# Patient Record
Sex: MALE | Race: WHITE
[De-identification: names, ages, dates, MRNs, and addresses within clinical notes are randomized per-mention and may not be internally consistent; named-entity substitution may affect disease eponyms.]

---

## 2018-03-30 ENCOUNTER — HOSPITAL ENCOUNTER (INPATIENT)
Dept: HOSPITAL 83 - ED | Age: 68
LOS: 1 days | Discharge: HOME | DRG: 69 | End: 2018-03-31
Attending: INTERNAL MEDICINE | Admitting: INTERNAL MEDICINE
Payer: MEDICARE

## 2018-03-30 VITALS — DIASTOLIC BLOOD PRESSURE: 85 MMHG | SYSTOLIC BLOOD PRESSURE: 160 MMHG

## 2018-03-30 VITALS — WEIGHT: 243.5 LBS | HEIGHT: 71.97 IN | BODY MASS INDEX: 32.98 KG/M2

## 2018-03-30 VITALS — SYSTOLIC BLOOD PRESSURE: 159 MMHG | DIASTOLIC BLOOD PRESSURE: 82 MMHG

## 2018-03-30 VITALS — DIASTOLIC BLOOD PRESSURE: 69 MMHG | SYSTOLIC BLOOD PRESSURE: 156 MMHG

## 2018-03-30 VITALS — DIASTOLIC BLOOD PRESSURE: 71 MMHG

## 2018-03-30 VITALS — DIASTOLIC BLOOD PRESSURE: 79 MMHG

## 2018-03-30 DIAGNOSIS — Z87.891: ICD-10-CM

## 2018-03-30 DIAGNOSIS — Z98.42: ICD-10-CM

## 2018-03-30 DIAGNOSIS — Z91.048: ICD-10-CM

## 2018-03-30 DIAGNOSIS — F32.9: ICD-10-CM

## 2018-03-30 DIAGNOSIS — I10: ICD-10-CM

## 2018-03-30 DIAGNOSIS — D64.9: ICD-10-CM

## 2018-03-30 DIAGNOSIS — Z79.899: ICD-10-CM

## 2018-03-30 DIAGNOSIS — Z98.41: ICD-10-CM

## 2018-03-30 DIAGNOSIS — Z96.652: ICD-10-CM

## 2018-03-30 DIAGNOSIS — D72.821: ICD-10-CM

## 2018-03-30 DIAGNOSIS — G45.9: Primary | ICD-10-CM

## 2018-03-30 LAB
ALBUMIN SERPL-MCNC: 3 GM/DL (ref 3.1–4.5)
ALP SERPL-CCNC: 54 U/L (ref 45–117)
ALT SERPL W P-5'-P-CCNC: 37 U/L (ref 12–78)
AST SERPL-CCNC: 15 IU/L (ref 3–35)
BASOPHILS # BLD AUTO: 0.1 10*3/UL (ref 0–0.1)
BASOPHILS NFR BLD AUTO: 0.8 % (ref 0–1)
BUN SERPL-MCNC: 10 MG/DL (ref 7–24)
CHLORIDE SERPL-SCNC: 102 MMOL/L (ref 98–107)
CREAT SERPL-MCNC: 0.89 MG/DL (ref 0.7–1.3)
EOSINOPHIL # BLD AUTO: 0.2 10*3/UL (ref 0–0.4)
EOSINOPHIL # BLD AUTO: 2.6 % (ref 1–4)
ERYTHROCYTE [DISTWIDTH] IN BLOOD BY AUTOMATED COUNT: 12.4 % (ref 0–14.5)
HCT VFR BLD AUTO: 40.7 % (ref 42–52)
HGB BLD-MCNC: 13.8 G/DL (ref 14–18)
INR BLD: 0.9 (ref 2–3.5)
LYMPHOCYTES # BLD AUTO: 0.9 10*3/UL (ref 1.3–4.4)
LYMPHOCYTES NFR BLD AUTO: 14.5 % (ref 27–41)
MCH RBC QN AUTO: 29 PG (ref 27–31)
MCHC RBC AUTO-ENTMCNC: 33.9 G/DL (ref 33–37)
MCV RBC AUTO: 85.5 FL (ref 80–94)
MONOCYTES # BLD AUTO: 0.7 10*3/UL (ref 0.1–1)
MONOCYTES NFR BLD MANUAL: 11.6 % (ref 3–9)
NEUT #: 4.3 10*3/UL (ref 2.3–7.9)
NEUT %: 70.2 % (ref 47–73)
NRBC BLD QL AUTO: 0 % (ref 0–0)
PLATELET # BLD AUTO: 259 10*3/UL (ref 130–400)
PMV BLD AUTO: 9.2 FL (ref 9.6–12.3)
POTASSIUM SERPL-SCNC: 3.9 MMOL/L (ref 3.5–5.1)
PROT SERPL-MCNC: 6.2 GM/DL (ref 6.4–8.2)
RBC # BLD AUTO: 4.76 10*6/UL (ref 4.5–5.9)
SODIUM SERPL-SCNC: 140 MMOL/L (ref 136–145)
TROPONIN I SERPL-MCNC: < 0.015 NG/ML (ref ?–0.04)
WBC NRBC COR # BLD AUTO: 6.1 10*3/UL (ref 4.8–10.8)

## 2018-03-31 VITALS — DIASTOLIC BLOOD PRESSURE: 90 MMHG

## 2018-03-31 VITALS — DIASTOLIC BLOOD PRESSURE: 80 MMHG

## 2018-03-31 VITALS — SYSTOLIC BLOOD PRESSURE: 176 MMHG | DIASTOLIC BLOOD PRESSURE: 82 MMHG

## 2018-03-31 VITALS — SYSTOLIC BLOOD PRESSURE: 150 MMHG | DIASTOLIC BLOOD PRESSURE: 84 MMHG

## 2018-03-31 LAB
25(OH)D3 SERPL-MCNC: 36.2 NG/ML (ref 30–100)
ALBUMIN SERPL-MCNC: 2.8 GM/DL (ref 3.1–4.5)
ALP SERPL-CCNC: 48 U/L (ref 45–117)
ALT SERPL W P-5'-P-CCNC: 34 U/L (ref 12–78)
AST SERPL-CCNC: 15 IU/L (ref 3–35)
BASOPHILS # BLD AUTO: 0.1 10*3/UL (ref 0–0.1)
BASOPHILS NFR BLD AUTO: 0.8 % (ref 0–1)
BUN SERPL-MCNC: 8 MG/DL (ref 7–24)
CHLORIDE SERPL-SCNC: 102 MMOL/L (ref 98–107)
CHOLEST SERPL-MCNC: 167 MG/DL (ref ?–200)
CREAT SERPL-MCNC: 0.68 MG/DL (ref 0.7–1.3)
EOSINOPHIL # BLD AUTO: 0.3 10*3/UL (ref 0–0.4)
EOSINOPHIL # BLD AUTO: 4.8 % (ref 1–4)
ERYTHROCYTE [DISTWIDTH] IN BLOOD BY AUTOMATED COUNT: 12.3 % (ref 0–14.5)
HCT VFR BLD AUTO: 38.8 % (ref 42–52)
HDLC SERPL-MCNC: 62 MG/DL (ref 40–60)
HGB BLD-MCNC: 13.2 G/DL (ref 14–18)
INR BLD: 0.9 (ref 2–3.5)
LDLC SERPL DIRECT ASSAY-MCNC: 92 MG/DL (ref 9–159)
LYMPHOCYTES # BLD AUTO: 2 10*3/UL (ref 1.3–4.4)
LYMPHOCYTES NFR BLD AUTO: 33.4 % (ref 27–41)
MCH RBC QN AUTO: 28.8 PG (ref 27–31)
MCHC RBC AUTO-ENTMCNC: 34 G/DL (ref 33–37)
MCV RBC AUTO: 84.7 FL (ref 80–94)
MONOCYTES # BLD AUTO: 0.8 10*3/UL (ref 0.1–1)
MONOCYTES NFR BLD MANUAL: 12.9 % (ref 3–9)
NEUT #: 2.8 10*3/UL (ref 2.3–7.9)
NEUT %: 47.9 % (ref 47–73)
NRBC BLD QL AUTO: 0 % (ref 0–0)
PHOSPHATE SERPL-MCNC: 3 MG/DL (ref 2.5–4.9)
PLATELET # BLD AUTO: 265 10*3/UL (ref 130–400)
PMV BLD AUTO: 9.9 FL (ref 9.6–12.3)
POTASSIUM SERPL-SCNC: 3.1 MMOL/L (ref 3.5–5.1)
PROT SERPL-MCNC: 6 GM/DL (ref 6.4–8.2)
RBC # BLD AUTO: 4.58 10*6/UL (ref 4.5–5.9)
SODIUM SERPL-SCNC: 139 MMOL/L (ref 136–145)
T4 FREE SERPL-MCNC: 1.14 NG/DL (ref 0.76–1.46)
TRIGL SERPL-MCNC: 66 MG/DL (ref ?–150)
TSH SERPL DL<=0.005 MIU/L-ACNC: 0.02 UIU/ML (ref 0.36–4.75)
VITAMIN B12: 403 PG/ML (ref 247–911)
VLDLC SERPL CALC-MCNC: 13 MG/DL (ref 6–40)
WBC NRBC COR # BLD AUTO: 5.9 10*3/UL (ref 4.8–10.8)

## 2018-04-02 ENCOUNTER — HOSPITAL ENCOUNTER (OUTPATIENT)
Dept: HOSPITAL 83 - MRI | Age: 68
Discharge: HOME | End: 2018-04-02
Attending: HOSPITALIST
Payer: MEDICARE

## 2018-04-02 DIAGNOSIS — I51.7: Primary | ICD-10-CM

## 2018-06-20 DIAGNOSIS — I73.9 PVD (PERIPHERAL VASCULAR DISEASE) (HCC): Primary | ICD-10-CM

## 2020-01-23 ENCOUNTER — HOSPITAL ENCOUNTER (INPATIENT)
Dept: HOSPITAL 83 - ED | Age: 70
LOS: 1 days | Discharge: HOME | DRG: 641 | End: 2020-01-24
Attending: INTERNAL MEDICINE | Admitting: INTERNAL MEDICINE
Payer: MEDICARE

## 2020-01-23 VITALS — HEIGHT: 72 IN | WEIGHT: 243.5 LBS | BODY MASS INDEX: 32.98 KG/M2

## 2020-01-23 VITALS — DIASTOLIC BLOOD PRESSURE: 68 MMHG

## 2020-01-23 VITALS — SYSTOLIC BLOOD PRESSURE: 175 MMHG | DIASTOLIC BLOOD PRESSURE: 74 MMHG

## 2020-01-23 DIAGNOSIS — E87.1: ICD-10-CM

## 2020-01-23 DIAGNOSIS — Z96.652: ICD-10-CM

## 2020-01-23 DIAGNOSIS — E66.9: ICD-10-CM

## 2020-01-23 DIAGNOSIS — Z87.891: ICD-10-CM

## 2020-01-23 DIAGNOSIS — E87.6: Primary | ICD-10-CM

## 2020-01-23 DIAGNOSIS — Z98.41: ICD-10-CM

## 2020-01-23 DIAGNOSIS — E78.5: ICD-10-CM

## 2020-01-23 DIAGNOSIS — E03.9: ICD-10-CM

## 2020-01-23 DIAGNOSIS — R55: ICD-10-CM

## 2020-01-23 DIAGNOSIS — I10: ICD-10-CM

## 2020-01-23 DIAGNOSIS — Z86.73: ICD-10-CM

## 2020-01-23 DIAGNOSIS — Z82.49: ICD-10-CM

## 2020-01-23 DIAGNOSIS — Z80.8: ICD-10-CM

## 2020-01-23 DIAGNOSIS — Z91.09: ICD-10-CM

## 2020-01-23 DIAGNOSIS — Z98.42: ICD-10-CM

## 2020-01-23 DIAGNOSIS — Z79.899: ICD-10-CM

## 2020-01-23 DIAGNOSIS — R73.9: ICD-10-CM

## 2020-01-23 DIAGNOSIS — F32.9: ICD-10-CM

## 2020-01-23 LAB
ALBUMIN SERPL-MCNC: 3.5 GM/DL (ref 3.1–4.5)
ALP SERPL-CCNC: 48 U/L (ref 45–117)
ALT SERPL W P-5'-P-CCNC: 46 U/L (ref 12–78)
APTT PPP: 25.4 SECONDS (ref 20–32.1)
AST SERPL-CCNC: 14 IU/L (ref 3–35)
BASOPHILS # BLD AUTO: 0.1 10*3/UL (ref 0–0.1)
BASOPHILS NFR BLD AUTO: 0.9 % (ref 0–1)
BUN SERPL-MCNC: 10 MG/DL (ref 7–24)
CHLORIDE SERPL-SCNC: 99 MMOL/L (ref 98–107)
CREAT SERPL-MCNC: 1.05 MG/DL (ref 0.7–1.3)
EOSINOPHIL # BLD AUTO: 0.2 10*3/UL (ref 0–0.4)
EOSINOPHIL # BLD AUTO: 3.1 % (ref 1–4)
ERYTHROCYTE [DISTWIDTH] IN BLOOD BY AUTOMATED COUNT: 12.6 % (ref 0–14.5)
HCT VFR BLD AUTO: 43.7 % (ref 42–52)
HGB BLD-MCNC: 15 G/DL (ref 14–18)
INR BLD: 0.9 (ref 2–3.5)
LIPASE SERPL-CCNC: 82 U/L (ref 73–393)
LYMPHOCYTES # BLD AUTO: 1.3 10*3/UL (ref 1.3–4.4)
LYMPHOCYTES NFR BLD AUTO: 22.8 % (ref 27–41)
MCH RBC QN AUTO: 29 PG (ref 27–31)
MCHC RBC AUTO-ENTMCNC: 34.3 G/DL (ref 33–37)
MCV RBC AUTO: 84.5 FL (ref 80–94)
MONOCYTES # BLD AUTO: 0.5 10*3/UL (ref 0.1–1)
MONOCYTES NFR BLD MANUAL: 8.7 % (ref 3–9)
NEUT #: 3.5 10*3/UL (ref 2.3–7.9)
NEUT %: 64.1 % (ref 47–73)
NRBC BLD QL AUTO: 0 10*3/UL (ref 0–0)
PLATELET # BLD AUTO: 247 10*3/UL (ref 130–400)
PMV BLD AUTO: 9.4 FL (ref 9.6–12.3)
POTASSIUM SERPL-SCNC: 3.1 MMOL/L (ref 3.5–5.1)
PROT SERPL-MCNC: 6.8 GM/DL (ref 6.4–8.2)
RBC # BLD AUTO: 5.17 10*6/UL (ref 4.5–5.9)
SODIUM SERPL-SCNC: 135 MMOL/L (ref 136–145)
TROPONIN I SERPL-MCNC: < 0.015 NG/ML (ref ?–0.04)
WBC NRBC COR # BLD AUTO: 5.5 10*3/UL (ref 4.8–10.8)

## 2020-01-24 VITALS — DIASTOLIC BLOOD PRESSURE: 64 MMHG

## 2020-01-24 VITALS — SYSTOLIC BLOOD PRESSURE: 152 MMHG | DIASTOLIC BLOOD PRESSURE: 68 MMHG

## 2020-01-24 VITALS — DIASTOLIC BLOOD PRESSURE: 73 MMHG

## 2020-01-24 LAB
25(OH)D3 SERPL-MCNC: 31.9 NG/ML (ref 30–100)
BASOPHILS # BLD AUTO: 0.1 10*3/UL (ref 0–0.1)
BASOPHILS NFR BLD AUTO: 1 % (ref 0–1)
BUN SERPL-MCNC: 10 MG/DL (ref 7–24)
CHLORIDE SERPL-SCNC: 105 MMOL/L (ref 98–107)
CHOLEST SERPL-MCNC: 205 MG/DL (ref ?–200)
CREAT SERPL-MCNC: 0.88 MG/DL (ref 0.7–1.3)
EOSINOPHIL # BLD AUTO: 0.3 10*3/UL (ref 0–0.4)
EOSINOPHIL # BLD AUTO: 5.5 % (ref 1–4)
ERYTHROCYTE [DISTWIDTH] IN BLOOD BY AUTOMATED COUNT: 12.8 % (ref 0–14.5)
HCT VFR BLD AUTO: 42.7 % (ref 42–52)
HDLC SERPL-MCNC: 67 MG/DL (ref 40–60)
HGB BLD-MCNC: 14.5 G/DL (ref 14–18)
LDLC SERPL DIRECT ASSAY-MCNC: 121 MG/DL (ref 9–159)
LYMPHOCYTES # BLD AUTO: 1.8 10*3/UL (ref 1.3–4.4)
LYMPHOCYTES NFR BLD AUTO: 29.4 % (ref 27–41)
MCH RBC QN AUTO: 28.9 PG (ref 27–31)
MCHC RBC AUTO-ENTMCNC: 34 G/DL (ref 33–37)
MCV RBC AUTO: 85.1 FL (ref 80–94)
MONOCYTES # BLD AUTO: 0.7 10*3/UL (ref 0.1–1)
MONOCYTES NFR BLD MANUAL: 11.3 % (ref 3–9)
NEUT #: 3.2 10*3/UL (ref 2.3–7.9)
NEUT %: 52.3 % (ref 47–73)
NRBC BLD QL AUTO: 0 10*3/UL (ref 0–0)
PHOSPHATE SERPL-MCNC: 3.6 MG/DL (ref 2.5–4.9)
PLATELET # BLD AUTO: 255 10*3/UL (ref 130–400)
PMV BLD AUTO: 9.7 FL (ref 9.6–12.3)
POTASSIUM SERPL-SCNC: 3.2 MMOL/L (ref 3.5–5.1)
RBC # BLD AUTO: 5.02 10*6/UL (ref 4.5–5.9)
SODIUM SERPL-SCNC: 141 MMOL/L (ref 136–145)
T4 FREE SERPL-MCNC: 1 NG/DL (ref 0.76–1.46)
TRIGL SERPL-MCNC: 85 MG/DL (ref ?–150)
TSH SERPL DL<=0.005 MIU/L-ACNC: 4.2 UIU/ML (ref 0.36–4.75)
VITAMIN B12: 675 PG/ML (ref 247–911)
VLDLC SERPL CALC-MCNC: 17 MG/DL (ref 6–40)
WBC NRBC COR # BLD AUTO: 6.2 10*3/UL (ref 4.8–10.8)

## 2020-01-24 PROCEDURE — 4A02XM4 MEASUREMENT OF CARDIAC TOTAL ACTIVITY, EXTERNAL APPROACH: ICD-10-PCS | Performed by: INTERNAL MEDICINE

## 2020-01-24 PROCEDURE — 3E073KZ INTRODUCTION OF OTHER DIAGNOSTIC SUBSTANCE INTO CORONARY ARTERY, PERCUTANEOUS APPROACH: ICD-10-PCS | Performed by: INTERNAL MEDICINE

## 2020-01-24 NOTE — NUR
SHANIKA THOMPSON [id number]
 
Please refer to the physician's history and physical for past medical history,
comorbid conditions, and allergies.
   Diagnosis: SYNCOPE
 
   Richardson Score: 21,LOW OR NO RISK
 
WOUND DESCRIPTIONS:
Wound Number: 1
Location of the wound: Left side of face proximal
Type of wound: abrasion
Thickness: Partial
Size: 5.0cm x 0.1cm x 0.1cm
Tunneling: none
Undermining: none
Sinus Tract: none
Presence of Exudate: none
Amount: none
Color: red
Odor: none
Periwound Skin Appearance: Normal
Wound edges: approximated
Pain (associated with wound): none at time of assessment
How does patient state this happened? pt stated that he hit it off the cabinet
but was joking around saying he is going to blame it on his wife of many years
 
Wound Number: 1
Location of the wound: Left side of face distal
Type of wound: abrasion
Thickness: Partial
Size: 0.1cm x 1.8cm x 0.1cm
Tunneling: none
Undermining: none
Sinus Tract: none
Presence of Exudate: none
Amount: none
Color: red
Odor: none
Periwound Skin Appearance: Normal
Wound edges: approximated
Pain (associated with wound): none at time of assessment
How does patient state this happened? pt stated that he hit it off the cabinet
but was joking around saying he is going to blame it on his wife of many years
   Surface the patient is resting on: Isoflex
 
SKIN PREVENTION RECOMMENDATION:
   1.  Pressure redistribution support surface as appropriate
   2.  Elevate heels
   3.  Remove boots/TEDS every shift and reapply
   4.  Head of bed 30 degrees as tolerated
   5.  Assess nutrition and hydration
   6.  Manage moisture
   7.  Avoid the use of containment devices while in bed
   8.  Use absorptive products on surfaces limit layers of linens on bed
   9.  Turn and reposition every 1-2 hours in bed and every 1 hour in chair as
       tolerated
   10. Weight shifts every 15 minutes while up in chair
   11. Offloading with pillows or device to keep heels elevated off bed
   12. Monitor skin at least every shift
   13. Inspect under medical devices twice a day
 
WOUND TREATMENT RECOMMENDATIONS:
Cleanse left side proximal and left side distal with nss and apply sureprep
around the wound hydrogel to wound bed and cover with bandaid daily and prn
for soiling.

## 2020-01-24 NOTE — NUR
A 69, admitted to 4E, under the
services of MAJO Martini DO with a diagnosis of SYNCOPE.
Chief complaint is FALL/SENT FROM PCP FOR HEART PROBLEMS.
Patient arrived via stretcher from ER.
Monitor applied. Initial assessment completed.
Vital signs taken and recorded.
MAJO MARTINI DO notified of admission to the unit.
Orders received.
See assessment for past medical history, medications
and allergies.
Patient and/or family oriented to unit.
visitation policy reviewed.
Clothing/patient valuable form completed.
 
LAMBERT CORBETT A

## 2020-01-24 NOTE — NUR
Nursing screen received and chart reviewed. Patient hospitalized with
increased dizziness, blacking out and hit head. Consider Occupational Therapy
evaluation after medical management as indicated for d/c planning. Thank you
Margoth Liu OTR/l

## 2020-01-24 NOTE — NUR
Nutritional Support Services Note: Pt has a scabbed laceration to left cheek,
states he got it from hitting the cabinet at home. He is eating 100% of meals.
No nutrition intervention is needed at this time. Encouraged continued good
intake of meals.                             Michelle Brown Rdn Ld

## 2020-01-24 NOTE — NUR
INFORMED CONSENT OBTAINED FOR LEXISCAN NUCLEAR STRESS TEST WITH DR. PEREIRA.
RESTING EKG NSR WITH A RESTING HR OF 72 WITH BP /74. LUNGS CLEAR WITH
DIMINISHED BS WITH SPO2 OF 97% ON ROOM AIR. PT COMPLETED A 1:00 LEXISCAN
PROTOCOL RECEIVING LEXISCAN 0.4 MG IV OVER 10 SECONDS. HAD NO CHEST PAIN OR
ANY EKG CHANGES. HAD C/O NAUSEA AND "ARMS FELT HEAVY" THAT WAS RELIEVED IN
RECOVERY. HAD A PEAK HR OF 97 WITH BP /60. LAST RECOVERY HR OF 91 WITH
BP /80. AWAITING SCANNING IN STABLE CONDITION.

## 2020-01-24 NOTE — NUR
in to talk to patient.
Patient states lives at home with wife.
There are no steps in the home.
Physician: hill
Pharmacy:
Home health services: none
Patient's level of ADLs: INDEPENDENT
Patient has working utilities: all working
DME: none
Follow-up physician's appointment after d/c: will be made by hospitalist nurse
director upon discharge
Does patient want to access PORTAL?: no
Discharge plan discussed with patient, he lives at home with wife, he is
independent in adls and ambulation, he states he will return home when
medically stable and denies any home needs.
 
EDNA KAHN

## 2020-01-24 NOTE — NUR
Hep Lock discontinued.
Site asymptomatic. Pressure applied. Sterile dressing applied.
CARDIAC MONITOR REMOVED -
Discharge instructions reviewed with patient/family. Patient receptive and
verbalizes understanding. Follow-up care arranged. Written instructions given
to patient/family.
SELWYN STEWART

## 2020-01-24 NOTE — NUR
PHYSICAL THERAPY
 
Screen received pt from home with syncopal episode please consult PT pending
medical intervention and if pt's functional status is impaired/does not
improve, thank you
 
 
Dolores Malik PT

## 2020-01-24 NOTE — NUR
NO DISCHARGE PICTURES TAKEN AS PT REQUESTED DRESSING NOT BE REMOVED D/T
PULLING HAIR & JUST DRESSED @ 1400

## 2020-01-24 NOTE — NUR
SPOKE WITH DR. CHIN IN REGARDS TO EXERCISE STRESS TEST AND PATIENT HAVING
BAD KNEES AND MED REC BEING UP TO DATE. NO NEW ORDERS AT THIS TIME.

## 2020-12-31 ENCOUNTER — HOSPITAL ENCOUNTER (EMERGENCY)
Dept: HOSPITAL 83 - ED | Age: 70
Discharge: HOME | End: 2020-12-31
Payer: MEDICARE

## 2020-12-31 VITALS — HEIGHT: 60 IN

## 2020-12-31 DIAGNOSIS — R61: Primary | ICD-10-CM

## 2020-12-31 DIAGNOSIS — Z79.899: ICD-10-CM

## 2020-12-31 LAB
ALBUMIN SERPL-MCNC: 3.3 GM/DL (ref 3.1–4.5)
ALP SERPL-CCNC: 43 U/L (ref 45–117)
ALT SERPL W P-5'-P-CCNC: 45 U/L (ref 12–78)
APTT PPP: 24 SECONDS (ref 20–32.1)
AST SERPL-CCNC: 14 IU/L (ref 3–35)
BACTERIA #/AREA URNS HPF: (no result) /[HPF]
BASOPHILS # BLD AUTO: 0.1 10*3/UL (ref 0–0.1)
BASOPHILS NFR BLD AUTO: 0.8 % (ref 0–1)
BUN SERPL-MCNC: 12 MG/DL (ref 7–24)
CASTS URNS QL MICRO: (no result)
CHLORIDE SERPL-SCNC: 94 MMOL/L (ref 98–107)
CREAT SERPL-MCNC: 1.13 MG/DL (ref 0.7–1.3)
EOSINOPHIL # BLD AUTO: 0.2 10*3/UL (ref 0–0.4)
EOSINOPHIL # BLD AUTO: 3.5 % (ref 1–4)
EPI CELLS #/AREA URNS HPF: (no result) /[HPF]
ERYTHROCYTE [DISTWIDTH] IN BLOOD BY AUTOMATED COUNT: 12.4 % (ref 0–14.5)
HCT VFR BLD AUTO: 41 % (ref 42–52)
INR BLD: 0.9 (ref 2–3.5)
LIPASE SERPL-CCNC: 80 U/L (ref 73–393)
LYMPHOCYTES # BLD AUTO: 1.4 10*3/UL (ref 1.3–4.4)
LYMPHOCYTES NFR BLD AUTO: 20.9 % (ref 27–41)
MCH RBC QN AUTO: 28.8 PG (ref 27–31)
MCHC RBC AUTO-ENTMCNC: 33.9 G/DL (ref 33–37)
MCV RBC AUTO: 85.1 FL (ref 80–94)
MONOCYTES # BLD AUTO: 0.8 10*3/UL (ref 0.1–1)
MONOCYTES NFR BLD MANUAL: 11.4 % (ref 3–9)
NEUT #: 4.2 10*3/UL (ref 2.3–7.9)
NEUT %: 62.9 % (ref 47–73)
NRBC BLD QL AUTO: 0 % (ref 0–0)
PH UR STRIP: 7.5 [PH] (ref 4.5–8)
PLATELET # BLD AUTO: 233 10*3/UL (ref 130–400)
PMV BLD AUTO: 9 FL (ref 9.6–12.3)
POTASSIUM SERPL-SCNC: 3.1 MMOL/L (ref 3.5–5.1)
PROT SERPL-MCNC: 6.4 GM/DL (ref 6.4–8.2)
RBC # BLD AUTO: 4.82 10*6/UL (ref 4.5–5.9)
RBC #/AREA URNS HPF: (no result) RBC/HPF (ref 0–2)
SODIUM SERPL-SCNC: 132 MMOL/L (ref 136–145)
SP GR UR: 1.01 (ref 1–1.03)
TROPONIN I SERPL-MCNC: < 0.015 NG/ML (ref ?–0.04)
UROBILINOGEN UR STRIP-MCNC: 1 E.U./DL (ref 0–1)
WBC #/AREA URNS HPF: (no result) WBC/HPF (ref 0–5)
WBC NRBC COR # BLD AUTO: 6.7 10*3/UL (ref 4.8–10.8)

## 2021-05-23 ENCOUNTER — HOSPITAL ENCOUNTER (INPATIENT)
Dept: HOSPITAL 83 - ED | Age: 71
LOS: 3 days | Discharge: HOME | DRG: 314 | End: 2021-05-26
Attending: INTERNAL MEDICINE | Admitting: INTERNAL MEDICINE
Payer: MEDICARE

## 2021-05-23 VITALS — HEIGHT: 72 IN | BODY MASS INDEX: 35.28 KG/M2 | WEIGHT: 260.5 LBS

## 2021-05-23 VITALS — DIASTOLIC BLOOD PRESSURE: 63 MMHG | SYSTOLIC BLOOD PRESSURE: 131 MMHG

## 2021-05-23 VITALS — DIASTOLIC BLOOD PRESSURE: 50 MMHG

## 2021-05-23 VITALS — DIASTOLIC BLOOD PRESSURE: 45 MMHG

## 2021-05-23 VITALS — SYSTOLIC BLOOD PRESSURE: 68 MMHG

## 2021-05-23 VITALS — SYSTOLIC BLOOD PRESSURE: 106 MMHG | DIASTOLIC BLOOD PRESSURE: 59 MMHG

## 2021-05-23 VITALS — DIASTOLIC BLOOD PRESSURE: 44 MMHG

## 2021-05-23 VITALS — DIASTOLIC BLOOD PRESSURE: 67 MMHG

## 2021-05-23 VITALS — SYSTOLIC BLOOD PRESSURE: 88 MMHG | DIASTOLIC BLOOD PRESSURE: 50 MMHG

## 2021-05-23 VITALS — DIASTOLIC BLOOD PRESSURE: 43 MMHG

## 2021-05-23 VITALS — DIASTOLIC BLOOD PRESSURE: 41 MMHG

## 2021-05-23 VITALS — DIASTOLIC BLOOD PRESSURE: 69 MMHG

## 2021-05-23 DIAGNOSIS — I13.0: ICD-10-CM

## 2021-05-23 DIAGNOSIS — Z82.49: ICD-10-CM

## 2021-05-23 DIAGNOSIS — Z98.41: ICD-10-CM

## 2021-05-23 DIAGNOSIS — I50.32: ICD-10-CM

## 2021-05-23 DIAGNOSIS — Z83.3: ICD-10-CM

## 2021-05-23 DIAGNOSIS — R61: ICD-10-CM

## 2021-05-23 DIAGNOSIS — M17.11: ICD-10-CM

## 2021-05-23 DIAGNOSIS — N18.30: ICD-10-CM

## 2021-05-23 DIAGNOSIS — Z80.9: ICD-10-CM

## 2021-05-23 DIAGNOSIS — R73.9: ICD-10-CM

## 2021-05-23 DIAGNOSIS — Z96.652: ICD-10-CM
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
## 2021-05-23 DIAGNOSIS — F33.0: ICD-10-CM

## 2021-05-23 DIAGNOSIS — R55: ICD-10-CM

## 2021-05-23 DIAGNOSIS — Z91.048: ICD-10-CM

## 2021-05-23 DIAGNOSIS — I95.9: Primary | ICD-10-CM

## 2021-05-23 DIAGNOSIS — N17.0: ICD-10-CM

## 2021-05-23 DIAGNOSIS — E87.6: ICD-10-CM

## 2021-05-23 DIAGNOSIS — E86.0: ICD-10-CM

## 2021-05-23 DIAGNOSIS — E03.9: ICD-10-CM

## 2021-05-23 DIAGNOSIS — E78.2: ICD-10-CM

## 2021-05-23 DIAGNOSIS — E86.1: ICD-10-CM

## 2021-05-23 DIAGNOSIS — T50.2X5A: ICD-10-CM

## 2021-05-23 DIAGNOSIS — Z86.73: ICD-10-CM

## 2021-05-23 DIAGNOSIS — Z87.891: ICD-10-CM

## 2021-05-23 DIAGNOSIS — E66.01: ICD-10-CM

## 2021-05-23 DIAGNOSIS — R00.1: ICD-10-CM

## 2021-05-23 DIAGNOSIS — Z98.42: ICD-10-CM

## 2021-05-23 DIAGNOSIS — E87.1: ICD-10-CM

## 2021-05-23 LAB
ALBUMIN SERPL-MCNC: 3.2 GM/DL (ref 3.1–4.5)
ALP SERPL-CCNC: 36 U/L (ref 45–117)
ALT SERPL W P-5'-P-CCNC: 40 U/L (ref 12–78)
APTT PPP: 24.1 SECONDS (ref 20–32.1)
AST SERPL-CCNC: 16 IU/L (ref 3–35)
BASOPHILS # BLD AUTO: 0.1 10*3/UL (ref 0–0.1)
BASOPHILS NFR BLD AUTO: 1.2 % (ref 0–1)
BUN SERPL-MCNC: 16 MG/DL (ref 7–24)
CASTS URNS QL MICRO: (no result)
CHLORIDE SERPL-SCNC: 95 MMOL/L (ref 98–107)
CREAT SERPL-MCNC: 1.49 MG/DL (ref 0.7–1.3)
EOSINOPHIL # BLD AUTO: 0.3 10*3/UL (ref 0–0.4)
EOSINOPHIL # BLD AUTO: 4.8 % (ref 1–4)
ERYTHROCYTE [DISTWIDTH] IN BLOOD BY AUTOMATED COUNT: 12.9 % (ref 0–14.5)
HCT VFR BLD AUTO: 35.3 % (ref 42–52)
INR BLD: 1 (ref 2–3.5)
LYMPHOCYTES # BLD AUTO: 1.4 10*3/UL (ref 1.3–4.4)
LYMPHOCYTES NFR BLD AUTO: 23.8 % (ref 27–41)
MCH RBC QN AUTO: 29.3 PG (ref 27–31)
MCHC RBC AUTO-ENTMCNC: 34 G/DL (ref 33–37)
MCV RBC AUTO: 86.1 FL (ref 80–94)
MONOCYTES # BLD AUTO: 0.9 10*3/UL (ref 0.1–1)
MONOCYTES NFR BLD MANUAL: 14.9 % (ref 3–9)
NEUT #: 3.2 10*3/UL (ref 2.3–7.9)
NEUT %: 55 % (ref 47–73)
NRBC BLD QL AUTO: 0 % (ref 0–0)
PH UR STRIP: 7.5 [PH] (ref 4.5–8)
PLATELET # BLD AUTO: 209 10*3/UL (ref 130–400)
PMV BLD AUTO: 9.5 FL (ref 9.6–12.3)
POTASSIUM SERPL-SCNC: 2.9 MMOL/L (ref 3.5–5.1)
PROT SERPL-MCNC: 6.1 GM/DL (ref 6.4–8.2)
RBC # BLD AUTO: 4.1 10*6/UL (ref 4.5–5.9)
RBC #/AREA URNS HPF: (no result) RBC/HPF (ref 0–2)
SODIUM SERPL-SCNC: 134 MMOL/L (ref 136–145)
SP GR UR: 1.01 (ref 1–1.03)
TROPONIN I SERPL-MCNC: < 0.015 NG/ML (ref ?–0.04)
UROBILINOGEN UR STRIP-MCNC: 1 E.U./DL (ref 0–1)
WBC #/AREA URNS HPF: (no result) WBC/HPF (ref 0–5)
WBC NRBC COR # BLD AUTO: 5.8 10*3/UL (ref 4.8–10.8)

## 2021-05-24 VITALS — SYSTOLIC BLOOD PRESSURE: 154 MMHG | DIASTOLIC BLOOD PRESSURE: 67 MMHG

## 2021-05-24 VITALS — DIASTOLIC BLOOD PRESSURE: 63 MMHG

## 2021-05-24 VITALS — SYSTOLIC BLOOD PRESSURE: 149 MMHG | DIASTOLIC BLOOD PRESSURE: 60 MMHG

## 2021-05-24 VITALS — DIASTOLIC BLOOD PRESSURE: 71 MMHG

## 2021-05-24 VITALS — DIASTOLIC BLOOD PRESSURE: 81 MMHG

## 2021-05-24 VITALS — DIASTOLIC BLOOD PRESSURE: 72 MMHG

## 2021-05-24 LAB
BASOPHILS # BLD AUTO: 0.1 10*3/UL (ref 0–0.1)
BASOPHILS NFR BLD AUTO: 0.8 % (ref 0–1)
BUN SERPL-MCNC: 11 MG/DL (ref 7–24)
BUN SERPL-MCNC: 12 MG/DL (ref 7–24)
CHLORIDE SERPL-SCNC: 96 MMOL/L (ref 98–107)
CHLORIDE SERPL-SCNC: 98 MMOL/L (ref 98–107)
CREAT SERPL-MCNC: 0.88 MG/DL (ref 0.7–1.3)
CREAT SERPL-MCNC: 0.9 MG/DL (ref 0.7–1.3)
EOSINOPHIL # BLD AUTO: 0.3 10*3/UL (ref 0–0.4)
EOSINOPHIL # BLD AUTO: 5.4 % (ref 1–4)
ERYTHROCYTE [DISTWIDTH] IN BLOOD BY AUTOMATED COUNT: 12.9 % (ref 0–14.5)
HCT VFR BLD AUTO: 36.4 % (ref 42–52)
LYMPHOCYTES # BLD AUTO: 1.4 10*3/UL (ref 1.3–4.4)
LYMPHOCYTES NFR BLD AUTO: 24 % (ref 27–41)
MCH RBC QN AUTO: 29.2 PG (ref 27–31)
MCHC RBC AUTO-ENTMCNC: 34.1 G/DL (ref 33–37)
MCV RBC AUTO: 85.6 FL (ref 80–94)
MONOCYTES # BLD AUTO: 0.7 10*3/UL (ref 0.1–1)
MONOCYTES NFR BLD MANUAL: 12.5 % (ref 3–9)
NEUT #: 3.4 10*3/UL (ref 2.3–7.9)
NEUT %: 57 % (ref 47–73)
NRBC BLD QL AUTO: 0 % (ref 0–0)
PLATELET # BLD AUTO: 201 10*3/UL (ref 130–400)
PMV BLD AUTO: 10 FL (ref 9.6–12.3)
POTASSIUM SERPL-SCNC: 2.8 MMOL/L (ref 3.5–5.1)
POTASSIUM SERPL-SCNC: 3.2 MMOL/L (ref 3.5–5.1)
RBC # BLD AUTO: 4.25 10*6/UL (ref 4.5–5.9)
SODIUM SERPL-SCNC: 134 MMOL/L (ref 136–145)
SODIUM SERPL-SCNC: 135 MMOL/L (ref 136–145)
WBC NRBC COR # BLD AUTO: 5.9 10*3/UL (ref 4.8–10.8)

## 2021-05-25 VITALS — DIASTOLIC BLOOD PRESSURE: 70 MMHG | SYSTOLIC BLOOD PRESSURE: 148 MMHG

## 2021-05-25 VITALS — SYSTOLIC BLOOD PRESSURE: 106 MMHG | DIASTOLIC BLOOD PRESSURE: 58 MMHG

## 2021-05-25 VITALS — DIASTOLIC BLOOD PRESSURE: 81 MMHG | SYSTOLIC BLOOD PRESSURE: 160 MMHG

## 2021-05-25 VITALS — DIASTOLIC BLOOD PRESSURE: 74 MMHG

## 2021-05-25 VITALS — DIASTOLIC BLOOD PRESSURE: 76 MMHG

## 2021-05-25 LAB
BUN SERPL-MCNC: 11 MG/DL (ref 7–24)
CHLORIDE SERPL-SCNC: 103 MMOL/L (ref 98–107)
CREAT SERPL-MCNC: 0.81 MG/DL (ref 0.7–1.3)
POTASSIUM SERPL-SCNC: 3.2 MMOL/L (ref 3.5–5.1)
SODIUM SERPL-SCNC: 137 MMOL/L (ref 136–145)

## 2021-05-26 VITALS — SYSTOLIC BLOOD PRESSURE: 146 MMHG | DIASTOLIC BLOOD PRESSURE: 62 MMHG

## 2021-05-26 VITALS — DIASTOLIC BLOOD PRESSURE: 51 MMHG | SYSTOLIC BLOOD PRESSURE: 155 MMHG

## 2021-05-26 LAB
BUN SERPL-MCNC: 13 MG/DL (ref 7–24)
CHLORIDE SERPL-SCNC: 101 MMOL/L (ref 98–107)
CREAT SERPL-MCNC: 0.86 MG/DL (ref 0.7–1.3)
POTASSIUM SERPL-SCNC: 3.2 MMOL/L (ref 3.5–5.1)
SODIUM SERPL-SCNC: 136 MMOL/L (ref 136–145)

## 2021-05-29 ENCOUNTER — HOSPITAL ENCOUNTER (OUTPATIENT)
Dept: HOSPITAL 83 - LAB | Age: 71
Discharge: HOME | End: 2021-05-29
Attending: FAMILY MEDICINE
Payer: MEDICARE

## 2021-05-29 DIAGNOSIS — E87.6: ICD-10-CM

## 2021-05-29 DIAGNOSIS — E87.1: Primary | ICD-10-CM

## 2021-05-29 LAB
ALBUMIN SERPL-MCNC: 3.7 GM/DL (ref 3.1–4.5)
ALP SERPL-CCNC: 46 U/L (ref 45–117)
ALT SERPL W P-5'-P-CCNC: 46 U/L (ref 12–78)
AST SERPL-CCNC: 16 IU/L (ref 3–35)
BUN SERPL-MCNC: 10 MG/DL (ref 7–24)
CHLORIDE SERPL-SCNC: 99 MMOL/L (ref 98–107)
CHLORIDE UR-SCNC: 49 MMOL/L
CREAT SERPL-MCNC: 0.9 MG/DL (ref 0.7–1.3)
ERYTHROCYTE [DISTWIDTH] IN BLOOD BY AUTOMATED COUNT: 13.1 % (ref 0–14.5)
HCT VFR BLD AUTO: 38.8 % (ref 42–52)
MCH RBC QN AUTO: 29.8 PG (ref 27–31)
MCHC RBC AUTO-ENTMCNC: 34 G/DL (ref 33–37)
MCV RBC AUTO: 87.6 FL (ref 80–94)
NRBC BLD QL AUTO: 0 % (ref 0–0)
PLATELET # BLD AUTO: 229 10*3/UL (ref 130–400)
PMV BLD AUTO: 9.6 FL (ref 9.6–12.3)
POTASSIUM SERPL-SCNC: 4 MMOL/L (ref 3.5–5.1)
PROT SERPL-MCNC: 7.4 GM/DL (ref 6.4–8.2)
RBC # BLD AUTO: 4.43 10*6/UL (ref 4.5–5.9)
SODIUM SERPL-SCNC: 135 MMOL/L (ref 136–145)
WBC NRBC COR # BLD AUTO: 5.5 10*3/UL (ref 4.8–10.8)

## 2021-06-02 ENCOUNTER — HOSPITAL ENCOUNTER (EMERGENCY)
Dept: HOSPITAL 83 - ED | Age: 71
Discharge: HOME | End: 2021-06-02
Payer: MEDICARE

## 2021-06-02 VITALS — WEIGHT: 260 LBS | HEIGHT: 71.97 IN | BODY MASS INDEX: 35.21 KG/M2

## 2021-06-02 DIAGNOSIS — Z79.899: ICD-10-CM

## 2021-06-02 DIAGNOSIS — Z98.890: ICD-10-CM

## 2021-06-02 DIAGNOSIS — R42: Primary | ICD-10-CM

## 2021-06-02 LAB
ALBUMIN SERPL-MCNC: 2.9 GM/DL (ref 3.1–4.5)
ALP SERPL-CCNC: 41 U/L (ref 45–117)
ALT SERPL W P-5'-P-CCNC: 39 U/L (ref 12–78)
AST SERPL-CCNC: 15 IU/L (ref 3–35)
BASOPHILS # BLD AUTO: 0.1 10*3/UL (ref 0–0.1)
BASOPHILS NFR BLD AUTO: 0.9 % (ref 0–1)
BUN SERPL-MCNC: 9 MG/DL (ref 7–24)
CHLORIDE SERPL-SCNC: 102 MMOL/L (ref 98–107)
CREAT SERPL-MCNC: 1.06 MG/DL (ref 0.7–1.3)
EOSINOPHIL # BLD AUTO: 0.3 10*3/UL (ref 0–0.4)
EOSINOPHIL # BLD AUTO: 5.4 % (ref 1–4)
EPI CELLS #/AREA URNS HPF: (no result) /[HPF]
ERYTHROCYTE [DISTWIDTH] IN BLOOD BY AUTOMATED COUNT: 13.1 % (ref 0–14.5)
HCT VFR BLD AUTO: 35.5 % (ref 42–52)
LYMPHOCYTES # BLD AUTO: 1.2 10*3/UL (ref 1.3–4.4)
LYMPHOCYTES NFR BLD AUTO: 21.7 % (ref 27–41)
MCH RBC QN AUTO: 29.9 PG (ref 27–31)
MCHC RBC AUTO-ENTMCNC: 34.4 G/DL (ref 33–37)
MCV RBC AUTO: 87 FL (ref 80–94)
MONOCYTES # BLD AUTO: 0.8 10*3/UL (ref 0.1–1)
MONOCYTES NFR BLD MANUAL: 14.1 % (ref 3–9)
NEUT #: 3.1 10*3/UL (ref 2.3–7.9)
NEUT %: 57.3 % (ref 47–73)
NRBC BLD QL AUTO: 0 10*3/UL (ref 0–0)
PH UR STRIP: 7.5 [PH] (ref 4.5–8)
PLATELET # BLD AUTO: 268 10*3/UL (ref 130–400)
PMV BLD AUTO: 9.5 FL (ref 9.6–12.3)
POTASSIUM SERPL-SCNC: 3.6 MMOL/L (ref 3.5–5.1)
PROT SERPL-MCNC: 6.2 GM/DL (ref 6.4–8.2)
RBC # BLD AUTO: 4.08 10*6/UL (ref 4.5–5.9)
RBC #/AREA URNS HPF: (no result) RBC/HPF (ref 0–2)
SODIUM SERPL-SCNC: 132 MMOL/L (ref 136–145)
SP GR UR: <= 1.005 (ref 1–1.03)
TROPONIN I SERPL-MCNC: < 0.015 NG/ML (ref ?–0.04)
UROBILINOGEN UR STRIP-MCNC: 1 E.U./DL (ref 0–1)
WBC #/AREA URNS HPF: (no result) WBC/HPF (ref 0–5)
WBC NRBC COR # BLD AUTO: 5.4 10*3/UL (ref 4.8–10.8)

## 2021-06-03 ENCOUNTER — TELEPHONE (OUTPATIENT)
Dept: ADMINISTRATIVE | Age: 71
End: 2021-06-03

## 2021-11-07 ENCOUNTER — APPOINTMENT (OUTPATIENT)
Dept: GENERAL RADIOLOGY | Age: 71
DRG: 312 | End: 2021-11-07
Payer: MEDICARE

## 2021-11-07 ENCOUNTER — APPOINTMENT (OUTPATIENT)
Dept: CT IMAGING | Age: 71
DRG: 312 | End: 2021-11-07
Payer: MEDICARE

## 2021-11-07 ENCOUNTER — HOSPITAL ENCOUNTER (INPATIENT)
Age: 71
LOS: 3 days | Discharge: HOME | DRG: 312 | End: 2021-11-10
Attending: EMERGENCY MEDICINE | Admitting: INTERNAL MEDICINE
Payer: MEDICARE

## 2021-11-07 DIAGNOSIS — G45.9 TIA (TRANSIENT ISCHEMIC ATTACK): ICD-10-CM

## 2021-11-07 DIAGNOSIS — R55 SYNCOPE AND COLLAPSE: Primary | ICD-10-CM

## 2021-11-07 LAB
ANION GAP SERPL CALCULATED.3IONS-SCNC: 14 MMOL/L (ref 7–16)
BASOPHILS ABSOLUTE: 0.07 E9/L (ref 0–0.2)
BASOPHILS RELATIVE PERCENT: 1.2 % (ref 0–2)
BUN BLDV-MCNC: 13 MG/DL (ref 6–23)
CALCIUM SERPL-MCNC: 9 MG/DL (ref 8.6–10.2)
CHLORIDE BLD-SCNC: 90 MMOL/L (ref 98–107)
CO2: 30 MMOL/L (ref 22–29)
CREAT SERPL-MCNC: 1.3 MG/DL (ref 0.7–1.2)
EOSINOPHILS ABSOLUTE: 0.26 E9/L (ref 0.05–0.5)
EOSINOPHILS RELATIVE PERCENT: 4.4 % (ref 0–6)
GFR AFRICAN AMERICAN: >60
GFR NON-AFRICAN AMERICAN: 54 ML/MIN/1.73
GLUCOSE BLD-MCNC: 127 MG/DL (ref 74–99)
HCT VFR BLD CALC: 39.8 % (ref 37–54)
HEMOGLOBIN: 13.8 G/DL (ref 12.5–16.5)
IMMATURE GRANULOCYTES #: 0.03 E9/L
IMMATURE GRANULOCYTES %: 0.5 % (ref 0–5)
LYMPHOCYTES ABSOLUTE: 1.15 E9/L (ref 1.5–4)
LYMPHOCYTES RELATIVE PERCENT: 19.3 % (ref 20–42)
MCH RBC QN AUTO: 29.5 PG (ref 26–35)
MCHC RBC AUTO-ENTMCNC: 34.7 % (ref 32–34.5)
MCV RBC AUTO: 85 FL (ref 80–99.9)
MONOCYTES ABSOLUTE: 0.77 E9/L (ref 0.1–0.95)
MONOCYTES RELATIVE PERCENT: 12.9 % (ref 2–12)
NEUTROPHILS ABSOLUTE: 3.67 E9/L (ref 1.8–7.3)
NEUTROPHILS RELATIVE PERCENT: 61.7 % (ref 43–80)
PDW BLD-RTO: 13.2 FL (ref 11.5–15)
PLATELET # BLD: 221 E9/L (ref 130–450)
PMV BLD AUTO: 9.9 FL (ref 7–12)
POTASSIUM SERPL-SCNC: 3.9 MMOL/L (ref 3.5–5)
PRO-BNP: 55 PG/ML (ref 0–125)
RBC # BLD: 4.68 E12/L (ref 3.8–5.8)
SODIUM BLD-SCNC: 134 MMOL/L (ref 132–146)
TROPONIN, HIGH SENSITIVITY: 10 NG/L (ref 0–11)
WBC # BLD: 6 E9/L (ref 4.5–11.5)

## 2021-11-07 PROCEDURE — 70450 CT HEAD/BRAIN W/O DYE: CPT

## 2021-11-07 PROCEDURE — 6360000004 HC RX CONTRAST MEDICATION: Performed by: RADIOLOGY

## 2021-11-07 PROCEDURE — 2580000003 HC RX 258: Performed by: INTERNAL MEDICINE

## 2021-11-07 PROCEDURE — 84484 ASSAY OF TROPONIN QUANT: CPT

## 2021-11-07 PROCEDURE — 2060000000 HC ICU INTERMEDIATE R&B

## 2021-11-07 PROCEDURE — 70496 CT ANGIOGRAPHY HEAD: CPT

## 2021-11-07 PROCEDURE — 96372 THER/PROPH/DIAG INJ SC/IM: CPT

## 2021-11-07 PROCEDURE — 99285 EMERGENCY DEPT VISIT HI MDM: CPT

## 2021-11-07 PROCEDURE — 36415 COLL VENOUS BLD VENIPUNCTURE: CPT

## 2021-11-07 PROCEDURE — 70498 CT ANGIOGRAPHY NECK: CPT

## 2021-11-07 PROCEDURE — 85025 COMPLETE CBC W/AUTO DIFF WBC: CPT

## 2021-11-07 PROCEDURE — 6360000002 HC RX W HCPCS: Performed by: INTERNAL MEDICINE

## 2021-11-07 PROCEDURE — 83880 ASSAY OF NATRIURETIC PEPTIDE: CPT

## 2021-11-07 PROCEDURE — 2580000003 HC RX 258: Performed by: RADIOLOGY

## 2021-11-07 PROCEDURE — 80048 BASIC METABOLIC PNL TOTAL CA: CPT

## 2021-11-07 PROCEDURE — 93005 ELECTROCARDIOGRAM TRACING: CPT | Performed by: EMERGENCY MEDICINE

## 2021-11-07 PROCEDURE — 6370000000 HC RX 637 (ALT 250 FOR IP): Performed by: INTERNAL MEDICINE

## 2021-11-07 PROCEDURE — G0378 HOSPITAL OBSERVATION PER HR: HCPCS

## 2021-11-07 PROCEDURE — 71045 X-RAY EXAM CHEST 1 VIEW: CPT

## 2021-11-07 RX ORDER — METOLAZONE 2.5 MG/1
2.5 TABLET ORAL
COMMUNITY
End: 2021-12-30

## 2021-11-07 RX ORDER — ACETAMINOPHEN 650 MG/1
650 SUPPOSITORY RECTAL EVERY 6 HOURS PRN
Status: DISCONTINUED | OUTPATIENT
Start: 2021-11-07 | End: 2021-11-10 | Stop reason: HOSPADM

## 2021-11-07 RX ORDER — CARVEDILOL 25 MG/1
25 TABLET ORAL 2 TIMES DAILY WITH MEALS
Status: DISCONTINUED | OUTPATIENT
Start: 2021-11-07 | End: 2021-11-10 | Stop reason: HOSPADM

## 2021-11-07 RX ORDER — LEVOTHYROXINE SODIUM 0.03 MG/1
25 TABLET ORAL DAILY
COMMUNITY

## 2021-11-07 RX ORDER — CARVEDILOL 25 MG/1
25 TABLET ORAL 2 TIMES DAILY WITH MEALS
COMMUNITY

## 2021-11-07 RX ORDER — SODIUM CHLORIDE 9 MG/ML
25 INJECTION, SOLUTION INTRAVENOUS PRN
Status: DISCONTINUED | OUTPATIENT
Start: 2021-11-07 | End: 2021-11-10 | Stop reason: HOSPADM

## 2021-11-07 RX ORDER — BUMETANIDE 2 MG/1
2 TABLET ORAL DAILY
COMMUNITY
End: 2021-12-30

## 2021-11-07 RX ORDER — ANTIOX #8/OM3/DHA/EPA/LUT/ZEAX 250-2.5 MG
1 CAPSULE ORAL 2 TIMES DAILY
COMMUNITY

## 2021-11-07 RX ORDER — LISINOPRIL 20 MG/1
20 TABLET ORAL DAILY
Status: DISCONTINUED | OUTPATIENT
Start: 2021-11-08 | End: 2021-11-10 | Stop reason: HOSPADM

## 2021-11-07 RX ORDER — BUMETANIDE 1 MG/1
2 TABLET ORAL DAILY
Status: DISCONTINUED | OUTPATIENT
Start: 2021-11-07 | End: 2021-11-10 | Stop reason: HOSPADM

## 2021-11-07 RX ORDER — FLUOXETINE HYDROCHLORIDE 20 MG/1
20 CAPSULE ORAL DAILY
Status: DISCONTINUED | OUTPATIENT
Start: 2021-11-07 | End: 2021-11-10 | Stop reason: HOSPADM

## 2021-11-07 RX ORDER — POTASSIUM CHLORIDE 20 MEQ/1
20 TABLET, EXTENDED RELEASE ORAL DAILY
Status: DISCONTINUED | OUTPATIENT
Start: 2021-11-07 | End: 2021-11-10

## 2021-11-07 RX ORDER — ONDANSETRON 2 MG/ML
4 INJECTION INTRAMUSCULAR; INTRAVENOUS EVERY 6 HOURS PRN
Status: DISCONTINUED | OUTPATIENT
Start: 2021-11-07 | End: 2021-11-10 | Stop reason: HOSPADM

## 2021-11-07 RX ORDER — SODIUM CHLORIDE 0.9 % (FLUSH) 0.9 %
5-40 SYRINGE (ML) INJECTION EVERY 12 HOURS SCHEDULED
Status: DISCONTINUED | OUTPATIENT
Start: 2021-11-07 | End: 2021-11-10 | Stop reason: HOSPADM

## 2021-11-07 RX ORDER — PENICILLIN V POTASSIUM 500 MG/1
500 TABLET ORAL 3 TIMES DAILY
COMMUNITY
End: 2021-12-30

## 2021-11-07 RX ORDER — PREGABALIN 150 MG/1
150 CAPSULE ORAL 2 TIMES DAILY
COMMUNITY

## 2021-11-07 RX ORDER — SODIUM CHLORIDE 0.9 % (FLUSH) 0.9 %
10 SYRINGE (ML) INJECTION ONCE
Status: COMPLETED | OUTPATIENT
Start: 2021-11-07 | End: 2021-11-07

## 2021-11-07 RX ORDER — ONDANSETRON 4 MG/1
4 TABLET, ORALLY DISINTEGRATING ORAL EVERY 8 HOURS PRN
Status: DISCONTINUED | OUTPATIENT
Start: 2021-11-07 | End: 2021-11-10 | Stop reason: HOSPADM

## 2021-11-07 RX ORDER — LEVOTHYROXINE SODIUM 0.03 MG/1
25 TABLET ORAL DAILY
Status: DISCONTINUED | OUTPATIENT
Start: 2021-11-08 | End: 2021-11-10 | Stop reason: HOSPADM

## 2021-11-07 RX ORDER — POLYETHYLENE GLYCOL 3350 17 G/17G
17 POWDER, FOR SOLUTION ORAL DAILY PRN
Status: DISCONTINUED | OUTPATIENT
Start: 2021-11-07 | End: 2021-11-10 | Stop reason: HOSPADM

## 2021-11-07 RX ORDER — ACETAMINOPHEN 325 MG/1
650 TABLET ORAL EVERY 6 HOURS PRN
Status: DISCONTINUED | OUTPATIENT
Start: 2021-11-07 | End: 2021-11-10 | Stop reason: HOSPADM

## 2021-11-07 RX ORDER — PREGABALIN 75 MG/1
150 CAPSULE ORAL 2 TIMES DAILY
Status: DISCONTINUED | OUTPATIENT
Start: 2021-11-07 | End: 2021-11-10 | Stop reason: HOSPADM

## 2021-11-07 RX ORDER — M-VIT,TX,IRON,MINS/CALC/FOLIC 27MG-0.4MG
1 TABLET ORAL DAILY
COMMUNITY

## 2021-11-07 RX ORDER — POTASSIUM CHLORIDE 20 MEQ/1
20 TABLET, EXTENDED RELEASE ORAL DAILY
Status: ON HOLD | COMMUNITY
End: 2021-11-10 | Stop reason: HOSPADM

## 2021-11-07 RX ORDER — QUINAPRIL 20 MG/1
40 TABLET ORAL NIGHTLY
COMMUNITY

## 2021-11-07 RX ORDER — SODIUM CHLORIDE 0.9 % (FLUSH) 0.9 %
5-40 SYRINGE (ML) INJECTION PRN
Status: DISCONTINUED | OUTPATIENT
Start: 2021-11-07 | End: 2021-11-10 | Stop reason: HOSPADM

## 2021-11-07 RX ORDER — FLUOXETINE HYDROCHLORIDE 20 MG/1
20 CAPSULE ORAL DAILY
COMMUNITY

## 2021-11-07 RX ADMIN — Medication 10 ML: at 23:39

## 2021-11-07 RX ADMIN — CARVEDILOL 25 MG: 6.25 TABLET, FILM COATED ORAL at 23:34

## 2021-11-07 RX ADMIN — ENOXAPARIN SODIUM 40 MG: 100 INJECTION SUBCUTANEOUS at 23:35

## 2021-11-07 RX ADMIN — PREGABALIN 150 MG: 75 CAPSULE ORAL at 23:34

## 2021-11-07 RX ADMIN — IOPAMIDOL 70 ML: 755 INJECTION, SOLUTION INTRAVENOUS at 15:38

## 2021-11-07 RX ADMIN — Medication 10 ML: at 15:39

## 2021-11-07 NOTE — H&P
Hospitalist History & Physical      PCP: Scarlet May MD    Date of Admission: 11/7/2021    Date of Service: Pt seen/examined on 11/7/2021 and is  admitted to Inpatient with expected LOS greater than two midnights due to medical therapy. Chief Complaint:  had concerns including Loss of Consciousness (syncopal episode at [employer] today lasting 2 min. Upon waking pt had right sided facial droop, slurred speech, and right sided UE weakness. ). History Of Present Illness:    Mr. Aman Jaime, a 70y.o. year old male  who  has no past medical history on file. The pt with history of HTN, Hydrocephalus, comes in to the hospital after experiencing syncopal event while at [employer] earlier today. Pt does not remember the details of the incident. Family at bedside witness the event they state the patient passed out for about 4 minutes and was not arousable. No seizure loss of bowel or bladder or biting of tongue. There was left sided weakness after the incident that lasted for some time that has since been resolved. The pt currently denies any symptoms of weakness and states he is almost back to his baseline. In Ed troponin unremarkable. CTA neck showed Less than 50% stenosis of the bilateral cervical ICAs. CTA of the head showed No evidence of large vessel occlusion.  There is moderate narrowing of a distal left M2 branch. Pt has history of hydrocephalus and history of craniotomy at [facility] in the past. The patient is being admitted for further workup and management. History reviewed. No pertinent past medical history. History reviewed. No pertinent surgical history. Prior to Admission medications    Medication Sig Start Date End Date Taking?  Authorizing Provider   bumetanide (BUMEX) 2 MG tablet Take 2 mg by mouth daily   Yes Historical Provider, MD   carvedilol (COREG) 25 MG tablet Take 25 mg by mouth 2 times daily (with meals)   Yes Historical Provider, MD   FLUoxetine (PROZAC) 20 MG capsule Take 20 mg by mouth daily   Yes Historical Provider, MD   levothyroxine (SYNTHROID) 25 MCG tablet Take 25 mcg by mouth Daily   Yes Historical Provider, MD   penicillin v potassium (VEETID) 500 MG tablet Take 500 mg by mouth 3 times daily   Yes Historical Provider, MD   potassium chloride (KLOR-CON M) 20 MEQ extended release tablet Take 20 mEq by mouth daily   Yes Historical Provider, MD   pregabalin (LYRICA) 150 MG capsule Take 150 mg by mouth 2 times daily. Yes Historical Provider, MD   quinapril (ACCUPRIL) 20 MG tablet Take 40 mg by mouth nightly   Yes Historical Provider, MD   metOLazone (ZAROXOLYN) 2.5 MG tablet Take 2.5 mg by mouth Monday, Wednesday, Friday   Yes Historical Provider, MD   Multiple Vitamins-Minerals (THERAPEUTIC MULTIVITAMIN-MINERALS) tablet Take 1 tablet by mouth daily   Yes Historical Provider, MD   Multiple Vitamins-Minerals (PRESERVISION AREDS 2) CAPS Take 1 capsule by mouth 2 times daily   Yes Historical Provider, MD         Allergies:  Patient has no allergy information on record. Social History:    TOBACCO:   reports that he has never smoked. His smokeless tobacco use includes chew. ETOH:   reports current alcohol use of about 3.0 standard drinks of alcohol per week. Family History:    Reviewed in detail and negative for DM, CAD, Cancer, CVA. Positive as follows\"  History reviewed. No pertinent family history. REVIEW OF SYSTEMS:   Pertinent positives as noted in the HPI. All other systems reviewed and negative. PHYSICAL EXAM:  /75   Pulse 62   Temp 97.4 °F (36.3 °C) (Oral)   Resp 21   Ht 6' (1.829 m)   Wt 250 lb (113.4 kg)   SpO2 94%   BMI 33.91 kg/m²   General appearance: No apparent distress, appears stated age and cooperative. HEENT: Normal cephalic, atraumatic without obvious deformity. Pupils equal, round, and reactive to light. Extra ocular muscles intact. Conjunctivae/corneas clear. Neck: Supple, with full range of motion. No jugular venous distention.  Trachea midline. Respiratory:  Diminished. Clear otherwise  Cardiovascular:  RRR normal S1 S2  Abdomen:  Gaby positive BS. Nontender non distended. Musculoskeletal: No clubbing, cyanosis, edema of bilateral lower extremities. Brisk capillary refill. Skin: Normal skin color. No rashes or lesions. Neurologic:  Neurovascularly intact without any focal sensory/motor deficits. Cranial nerves: II-XII intact, grossly non-focal.  Psychiatric: Alert and oriented, thought content appropriate, normal insight    Reviewed EKG and CXR personally      CBC:   Recent Labs     11/07/21  1247   WBC 6.0   RBC 4.68   HGB 13.8   HCT 39.8   MCV 85.0   RDW 13.2        BMP:   Recent Labs     11/07/21  1247      K 3.9   CL 90*   CO2 30*   BUN 13   CREATININE 1.3*     LFT:  No results for input(s): PROT, ALB, ALKPHOS, ALT, AST, BILITOT, AMYLASE, LIPASE in the last 72 hours. CE:  No results for input(s): Champ Luisard in the last 72 hours. PT/INR: No results for input(s): INR, APTT in the last 72 hours. BNP: No results for input(s): BNP in the last 72 hours. ESR: No results found for: SEDRATE  CRP: No results found for: CRP  D Dimer: No results found for: DDIMER   Folate and B12: No results found for: HGWIJIHR12, No results found for: FOLATE  Lactic Acid: No results found for: LACTA  Thyroid Studies: No results found for: TSH, N9HLNQE, W9VLHBY, THYROIDAB    Oupatient labs:  No results found for: CHOL, TRIG, HDL, LDLCALC, TSH, PSA, INR, LABA1C    Urinalysis:  No results found for: NITRU, WBCUA, BACTERIA, RBCUA, BLOODU, SPECGRAV, GLUCOSEU    Imaging:  CT HEAD WO CONTRAST    Result Date: 11/7/2021  EXAMINATION: CT OF THE HEAD WITHOUT CONTRAST  11/7/2021 3:34 pm TECHNIQUE: CT of the head was performed without the administration of intravenous contrast. Dose modulation, iterative reconstruction, and/or weight based adjustment of the mA/kV was utilized to reduce the radiation dose to as low as reasonably achievable.  COMPARISON: None. HISTORY: ORDERING SYSTEM PROVIDED HISTORY: HEAD TRAUMA, CLOSED, MILD, GCS >= 13, NO RISK FACTORS, NEURO EXAM NORMAL TECHNOLOGIST PROVIDED HISTORY: Has a \"code stroke\" or \"stroke alert\" been called? ->No Reason for exam:->tia Decision Support Exception - unselect if not a suspected or confirmed emergency medical condition->Emergency Medical Condition (MA) What reading provider will be dictating this exam?->CRC FINDINGS: BRAIN/VENTRICLES: There is no acute intracranial hemorrhage, mass effect or midline shift. No abnormal extra-axial fluid collection. There is subtle periventricular low-density that suggest microangiopathy. The gray-white differentiation is maintained without evidence of an acute infarct. There is dilation of the ventricles including the 3rd and 4th ventricles concerning for developing hydrocephalus. If clinically indicated, MRI is of increased sensitivity. ORBITS: The visualized portion of the orbits demonstrate no acute abnormality. SINUSES: The visualized paranasal sinuses demonstrate partial opacification of multiple ethmoid air cells and in the maxillary antra bilaterally suggest chronic sinusitis. The mastoid air cells demonstrate no acute abnormality. SOFT TISSUES/SKULL:  No acute abnormality of the visualized skull or soft tissues. No acute intracranial abnormality. Dilated intracerebral ventricles concerning for developing hydrocephalus. Chronic appearing maxillary, ethmoid and right sphenoid sinusitis     XR CHEST PORTABLE    Result Date: 11/7/2021  EXAMINATION: ONE XRAY VIEW OF THE CHEST 11/7/2021 1:03 pm COMPARISON: None. HISTORY: ORDERING SYSTEM PROVIDED HISTORY: syncope TECHNOLOGIST PROVIDED HISTORY: Reason for exam:->syncope What reading provider will be dictating this exam?->CRC FINDINGS: A single frontal view the chest reveals the heart size to be at the upper limits of normal and left ventricular in contour.   No focal consolidation pneumothorax or pleural effusions are identified. There are no signs of pneumothorax. Mild cardiomegaly without signs of congestion     CTA NECK W CONTRAST    Result Date: 11/7/2021  EXAMINATION: CTA OF THE HEAD WITH CONTRAST; CTA OF THE NECK 11/7/2021 12:34 pm: TECHNIQUE: CTA of the head/brain was performed with the administration of intravenous contrast. Multiplanar reformatted images are provided for review. MIP images are provided for review. Dose modulation, iterative reconstruction, and/or weight based adjustment of the mA/kV was utilized to reduce the radiation dose to as low as reasonably achievable.; CTA of the neck was performed with the administration of intravenous contrast. Multiplanar reformatted images are provided for review. MIP images are provided for review. Stenosis of the internal carotid arteries measured using NASCET criteria. Dose modulation, iterative reconstruction, and/or weight based adjustment of the mA/kV was utilized to reduce the radiation dose to as low as reasonably achievable. COMPARISON: None. HISTORY: ORDERING SYSTEM PROVIDED HISTORY: tia TECHNOLOGIST PROVIDED HISTORY: Has a \"code stroke\" or \"stroke alert\" been called? ->No Reason for exam:->tia Decision Support Exception - unselect if not a suspected or confirmed emergency medical condition->Emergency Medical Condition (MA) What reading provider will be dictating this exam?->CRC FINDINGS: CTA NECK: Aortic arch: Three-vessel aortic arch. Common carotids: Retropharyngeal course of the bilateral CCA is. No high-grade stenosis of the bilateral CCA is. Internal carotids: Less than 50% stenosis of the bilateral ICA origin secondary to soft greater than calcified plaque. Vertebrals: The cervical vertebral arteries demonstrate no high-grade stenosis, occlusion, or dissection. Calcified plaque the left vertebral artery origin. Codominant vertebral arteries. Other findings: Degenerative changes of the spine.  CTA HEAD: ANTERIOR CIRCULATION: Intracranial ICA: No high-grade stenosis or occlusion of the intracranial internal carotid arteries. Calcified plaque in the bilateral carotid siphons. MCA: No proximal large vessel occlusion. Moderate narrowing of a left distal M2 branch near the apex of the sylvian fissure (series 7, image 309 and series 8, image 64 and series 9, image 72). YESIKA: No flow-limiting stenosis. POSTERIOR CIRCULATION: Intradural vertebral arteries: No flow-limiting stenosis. Basilar artery: No flow-limiting stenosis. PCA: No flow-limiting stenosis. Dural sinuses: The major dural venus sinuses are grossly patent. Other: Prior right frontal craniotomy. Small paranasal sinus mucous retention cysts and mild mucosal thickening. CTA Neck: Less than 50% stenosis of the bilateral cervical ICAs. No high-grade stenosis of the cervical vertebral arteries. CTA Head: No evidence of large vessel occlusion. There is moderate narrowing of a distal left M2 branch as described above. CTA HEAD W CONTRAST    Result Date: 11/7/2021  EXAMINATION: CTA OF THE HEAD WITH CONTRAST; CTA OF THE NECK 11/7/2021 12:34 pm: TECHNIQUE: CTA of the head/brain was performed with the administration of intravenous contrast. Multiplanar reformatted images are provided for review. MIP images are provided for review. Dose modulation, iterative reconstruction, and/or weight based adjustment of the mA/kV was utilized to reduce the radiation dose to as low as reasonably achievable.; CTA of the neck was performed with the administration of intravenous contrast. Multiplanar reformatted images are provided for review. MIP images are provided for review. Stenosis of the internal carotid arteries measured using NASCET criteria. Dose modulation, iterative reconstruction, and/or weight based adjustment of the mA/kV was utilized to reduce the radiation dose to as low as reasonably achievable. COMPARISON: None.  HISTORY: ORDERING SYSTEM PROVIDED HISTORY: tia TECHNOLOGIST PROVIDED HISTORY: Has a \"code stroke\" or \"stroke alert\" been called? ->No Reason for exam:->tia Decision Support Exception - unselect if not a suspected or confirmed emergency medical condition->Emergency Medical Condition (MA) What reading provider will be dictating this exam?->CRC FINDINGS: CTA NECK: Aortic arch: Three-vessel aortic arch. Common carotids: Retropharyngeal course of the bilateral CCA is. No high-grade stenosis of the bilateral CCA is. Internal carotids: Less than 50% stenosis of the bilateral ICA origin secondary to soft greater than calcified plaque. Vertebrals: The cervical vertebral arteries demonstrate no high-grade stenosis, occlusion, or dissection. Calcified plaque the left vertebral artery origin. Codominant vertebral arteries. Other findings: Degenerative changes of the spine. CTA HEAD: ANTERIOR CIRCULATION: Intracranial ICA: No high-grade stenosis or occlusion of the intracranial internal carotid arteries. Calcified plaque in the bilateral carotid siphons. MCA: No proximal large vessel occlusion. Moderate narrowing of a left distal M2 branch near the apex of the sylvian fissure (series 7, image 309 and series 8, image 64 and series 9, image 72). YESIKA: No flow-limiting stenosis. POSTERIOR CIRCULATION: Intradural vertebral arteries: No flow-limiting stenosis. Basilar artery: No flow-limiting stenosis. PCA: No flow-limiting stenosis. Dural sinuses: The major dural venus sinuses are grossly patent. Other: Prior right frontal craniotomy. Small paranasal sinus mucous retention cysts and mild mucosal thickening. CTA Neck: Less than 50% stenosis of the bilateral cervical ICAs. No high-grade stenosis of the cervical vertebral arteries. CTA Head: No evidence of large vessel occlusion. There is moderate narrowing of a distal left M2 branch as described above. ASSESSMENT:    Active Problems:    TIA (transient ischemic attack)  Resolved Problems:    * No resolved hospital problems.  *  Syncope  HTN  History of Hydrocephalus and craniotomy in the past   Hypothroidism    PLAN:    Admit to medical floor. Consult Neurology. ECHO    Neuro checks every 4 hours. Pt is scheduled to have Cardiac cath in coming days. Reason unknown. Per family pt has had extensive workup for syncope in the past.   Continue Bumex for now. Cont. Synthroid. Check TSH  Continue appropriate home medications. Diet: No diet orders on file  Code Status: No Order  Surrogate decision maker confirmed with patient:   Extended Emergency Contact Information  Primary Emergency Contact: Yolanda Whitaker Phone: [phone]  Relation: Spouse  Preferred language: English   needed? No    DVT Prophylaxis: [x]Lovenox []Heparin []PCD [] [address] []Encouraged ambulation  Disposition: []Med/Surg [x] Intermediate [] ICU/CCU  Admit status: [] Observation [x] Inpatient     +++++++++++++++++++++++++++++++++++++++++++++++++  Mohit Dong MD  [address]  +++++++++++++++++++++++++++++++++++++++++++++++++  NOTE: This report was transcribed using voice recognition software. Every effort was made to ensure accuracy; however, inadvertent computerized transcription errors may be present.

## 2021-11-07 NOTE — ED NOTES
Name: Leslie Jacobs  : 1950  MRN: [id number]    Date: 2021    Benefits of immediately proceeding with Radiology exam outweigh the risks and therefore the following is being waived:      [] Pregnancy test    [] Protocol for Iodine allergy    [] MRI questionnaire    [x] BUN/Creatinine        MD Sharon Aponte MD  21 0737

## 2021-11-07 NOTE — ED PROVIDER NOTES
Patient signed out to me awaiting CT imaging, likely admission for TIA. CT imaging shows some stenosis at the distal left M2, no high-grade stenosis otherwise. This may be the cause of patient's transient symptoms. He remained neurologically intact during his ED course. He was admitted for further management.      Lisandro Lockhart DO  11/07/21 1828

## 2021-11-07 NOTE — ED PROVIDER NOTES
HPI:  11/7/21,   Time: 12:43 PM CHIOMA Leyva is a 70 y.o. male presenting to the ED for syncopal event with possible right  weakness and a right facial droop that had resolved prior to the arrival of air transport team, beginning 1 hour ago. The complaint has been intermittent, mild in severity, and worsened by nothing. Patient has had a prior craniotomy and states that he has a drain in his head that is draining his brain. Patient also states he has had a prior syncopal event. Patient states he is scheduled for a heart cath on Friday of this week at [facility] due to peripheral edema. Patient denies chest discomfort at this time. ROS:   Pertinent positives and negatives are stated within HPI, all other systems reviewed and are negative.  --------------------------------------------- PAST HISTORY ---------------------------------------------  Past Medical History:  has no past medical history on file. Past Surgical History:  has no past surgical history on file. Social History:  reports that he has never smoked. His smokeless tobacco use includes chew. He reports current alcohol use of about 3.0 standard drinks of alcohol per week. He reports that he does not use drugs. Family History: family history is not on file. The patients home medications have been reviewed.     Allergies: Patient has no allergy information on record.    -------------------------------------------------- RESULTS -------------------------------------------------  All laboratory and radiology results have been personally reviewed by myself   LABS:  Results for orders placed or performed during the hospital encounter of 94/29/03   Basic Metabolic Panel   Result Value Ref Range    Sodium 134 132 - 146 mmol/L    Potassium 3.9 3.5 - 5.0 mmol/L    Chloride 90 (L) 98 - 107 mmol/L    CO2 30 (H) 22 - 29 mmol/L    Anion Gap 14 7 - 16 mmol/L    Glucose 127 (H) 74 - 99 mg/dL    BUN 13 6 - 23 mg/dL    CREATININE 1.3 (H) 0.7 - 1.2 mg/dL    GFR Non-African American 54 >=60 mL/min/1.73    GFR African American >60     Calcium 9.0 8.6 - 10.2 mg/dL   CBC Auto Differential   Result Value Ref Range    WBC 6.0 4.5 - 11.5 E9/L    RBC 4.68 3.80 - 5.80 E12/L    Hemoglobin 13.8 12.5 - 16.5 g/dL    Hematocrit 39.8 37.0 - 54.0 %    MCV 85.0 80.0 - 99.9 fL    MCH 29.5 26.0 - 35.0 pg    MCHC 34.7 (H) 32.0 - 34.5 %    RDW 13.2 11.5 - 15.0 fL    Platelets 868 652 - 369 E9/L    MPV 9.9 7.0 - 12.0 fL    Neutrophils % 61.7 43.0 - 80.0 %    Immature Granulocytes % 0.5 0.0 - 5.0 %    Lymphocytes % 19.3 (L) 20.0 - 42.0 %    Monocytes % 12.9 (H) 2.0 - 12.0 %    Eosinophils % 4.4 0.0 - 6.0 %    Basophils % 1.2 0.0 - 2.0 %    Neutrophils Absolute 3.67 1.80 - 7.30 E9/L    Immature Granulocytes # 0.03 E9/L    Lymphocytes Absolute 1.15 (L) 1.50 - 4.00 E9/L    Monocytes Absolute 0.77 0.10 - 0.95 E9/L    Eosinophils Absolute 0.26 0.05 - 0.50 E9/L    Basophils Absolute 0.07 0.00 - 0.20 E9/L   Troponin   Result Value Ref Range    Troponin, High Sensitivity 10 0 - 11 ng/L   Brain Natriuretic Peptide   Result Value Ref Range    Pro-BNP 55 0 - 125 pg/mL       RADIOLOGY:  Interpreted by Radiologist.  XR CHEST PORTABLE   Final Result   Mild cardiomegaly without signs of congestion         CT HEAD WO CONTRAST    (Results Pending)   CTA HEAD W CONTRAST    (Results Pending)   CTA NECK W CONTRAST    (Results Pending)       ------------------------- NURSING NOTES AND VITALS REVIEWED ---------------------------   The nursing notes within the ED encounter and vital signs as below have been reviewed.    /70   Pulse 57   Temp 97.4 °F (36.3 °C) (Oral)   Resp 17   Ht 6' (1.829 m)   Wt 250 lb (113.4 kg)   SpO2 96%   BMI 33.91 kg/m²   Oxygen Saturation Interpretation: Normal      ---------------------------------------------------PHYSICAL EXAM--------------------------------------      Constitutional/General: Alert and oriented x3, well appearing, non toxic in NAD  Head: NC/AT  Eyes: PERRL, EOMI  Mouth: Oropharynx clear, handling secretions, no trismus  Neck: Supple, full ROM, no meningeal signs  Pulmonary: Lungs clear to auscultation bilaterally, no wheezes, rales, or rhonchi. Not in respiratory distress  Cardiovascular:  Regular rate and rhythm, no murmurs, gallops, or rubs. 2+ distal pulses  Abdomen: Soft, non tender, non distended,   Extremities: Moves all extremities x 4. Warm and well perfused  Skin: warm and dry without rash  Neurologic: GCS 15, cranial nerves II through XII intact 5 full-strength normal gait no pronator drift no Romberg sign      Psych: Normal Affect  EKG: This EKG is signed and interpreted by me. Rate: 53  Rhythm: Sinus  Interpretation: no acute changes  Comparison: stable as compared to patient's most recent EKG      ------------------------------ ED COURSE/MEDICAL DECISION MAKING----------------------  Medications - No data to display      Medical Decision Making:    Was observed on a cardiac monitor CT scan and other diagnostic testing was performed. Patient was noted to be bradycardic at 53 bpm    NIH Stroke Scale at time of initial evaluation:  1A: Level of Consciousness 0 - alert; keenly responsive   1B: Ask Month and Age 0 - answers both questions correctly   1C:  Tell Patient To Open and Close Eyes, then Hand  Squeeze 0 - performs both tasks correctly   2: Test Horizontal Extraocular Movements 0 - normal   3: Test Visual Fields 0 - no visual loss   4: Test Facial Palsy 0 - normal symmetric movement   5A: Test Left Arm Motor Drift 0 - no drift, limb holds 90 (or 45) degrees for full 10 seconds   5B: Test Right Arm Motor Drift 0 - no drift, limb holds 90 (or 45) degrees for full 10 seconds   6A: Test Left Leg Motor Drift 0 - no drift; leg holds 30 degree position for full 5 seconds   6B: Test Right Leg Motor Drift 0 - no drift; leg holds 30 degree position for full 5 seconds   7: Test Limb Ataxia   (FNF/Heel-Shin) 0 - absent   8: Test Sensation 0 - normal; no sensory loss   9: Test Language/Aphasia 0 - no aphasia, normal   10: Test Dysarthria 0 - normal   11: Test Extinction/Inattention 0 - no abnormality   Total 0       Acute CVA Core Measures:      - t-PA Eligibility: IV t-PA was considered and not given due to violations in inclusion criteria including mild/rapidly resolving deficit          Counseling: The emergency provider has spoken with the patient and discussed todays results, in addition to providing specific details for the plan of care and counseling regarding the diagnosis and prognosis. Questions are answered at this time and they are agreeable with the plan.      --------------------------------- IMPRESSION AND DISPOSITION ---------------------------------    IMPRESSION  1. Syncope and collapse    2.  TIA (transient ischemic attack)        DISPOSITION  Disposition: Other Disposition: endorsed to Dr Taiwo Gar  Patient condition is serious                  Elizabet Yin MD  11/07/21 1189

## 2021-11-07 NOTE — ED NOTES
Bed: 16  Expected date:   Expected time:   Means of arrival:   Comments:  [id number] Naa Mcdonnell RN  11/07/21 8864

## 2021-11-08 ENCOUNTER — APPOINTMENT (OUTPATIENT)
Dept: MRI IMAGING | Age: 71
DRG: 312 | End: 2021-11-08
Payer: MEDICARE

## 2021-11-08 ENCOUNTER — APPOINTMENT (OUTPATIENT)
Dept: GENERAL RADIOLOGY | Age: 71
DRG: 312 | End: 2021-11-08
Payer: MEDICARE

## 2021-11-08 LAB
ALBUMIN SERPL-MCNC: 3.7 G/DL (ref 3.5–5.2)
ALP BLD-CCNC: 48 U/L (ref 40–129)
ALT SERPL-CCNC: 28 U/L (ref 0–40)
ANION GAP SERPL CALCULATED.3IONS-SCNC: 14 MMOL/L (ref 7–16)
AST SERPL-CCNC: 20 U/L (ref 0–39)
BASOPHILS ABSOLUTE: 0.06 E9/L (ref 0–0.2)
BASOPHILS RELATIVE PERCENT: 0.9 % (ref 0–2)
BILIRUB SERPL-MCNC: 0.5 MG/DL (ref 0–1.2)
BUN BLDV-MCNC: 10 MG/DL (ref 6–23)
CALCIUM SERPL-MCNC: 8.7 MG/DL (ref 8.6–10.2)
CHLORIDE BLD-SCNC: 92 MMOL/L (ref 98–107)
CO2: 28 MMOL/L (ref 22–29)
CREAT SERPL-MCNC: 0.9 MG/DL (ref 0.7–1.2)
EOSINOPHILS ABSOLUTE: 0.3 E9/L (ref 0.05–0.5)
EOSINOPHILS RELATIVE PERCENT: 4.7 % (ref 0–6)
FOLATE: >20 NG/ML (ref 4.8–24.2)
GFR AFRICAN AMERICAN: >60
GFR NON-AFRICAN AMERICAN: >60 ML/MIN/1.73
GLUCOSE BLD-MCNC: 95 MG/DL (ref 74–99)
HBA1C MFR BLD: 5.2 % (ref 4–5.6)
HCT VFR BLD CALC: 39.9 % (ref 37–54)
HEMOGLOBIN: 13.8 G/DL (ref 12.5–16.5)
IMMATURE GRANULOCYTES #: 0.03 E9/L
IMMATURE GRANULOCYTES %: 0.5 % (ref 0–5)
LYMPHOCYTES ABSOLUTE: 1.67 E9/L (ref 1.5–4)
LYMPHOCYTES RELATIVE PERCENT: 26.2 % (ref 20–42)
MAGNESIUM: 2 MG/DL (ref 1.6–2.6)
MCH RBC QN AUTO: 29.1 PG (ref 26–35)
MCHC RBC AUTO-ENTMCNC: 34.6 % (ref 32–34.5)
MCV RBC AUTO: 84 FL (ref 80–99.9)
MONOCYTES ABSOLUTE: 0.83 E9/L (ref 0.1–0.95)
MONOCYTES RELATIVE PERCENT: 13 % (ref 2–12)
NEUTROPHILS ABSOLUTE: 3.48 E9/L (ref 1.8–7.3)
NEUTROPHILS RELATIVE PERCENT: 54.7 % (ref 43–80)
PDW BLD-RTO: 13.2 FL (ref 11.5–15)
PLATELET # BLD: 241 E9/L (ref 130–450)
PMV BLD AUTO: 10.9 FL (ref 7–12)
POTASSIUM REFLEX MAGNESIUM: 2.8 MMOL/L (ref 3.5–5)
RBC # BLD: 4.75 E12/L (ref 3.8–5.8)
SODIUM BLD-SCNC: 134 MMOL/L (ref 132–146)
TOTAL PROTEIN: 6.4 G/DL (ref 6.4–8.3)
TSH SERPL DL<=0.05 MIU/L-ACNC: 2.2 UIU/ML (ref 0.27–4.2)
VITAMIN B-12: 1219 PG/ML (ref 211–946)
WBC # BLD: 6.4 E9/L (ref 4.5–11.5)

## 2021-11-08 PROCEDURE — 6360000002 HC RX W HCPCS: Performed by: INTERNAL MEDICINE

## 2021-11-08 PROCEDURE — 6370000000 HC RX 637 (ALT 250 FOR IP): Performed by: INTERNAL MEDICINE

## 2021-11-08 PROCEDURE — 82607 VITAMIN B-12: CPT

## 2021-11-08 PROCEDURE — 36415 COLL VENOUS BLD VENIPUNCTURE: CPT

## 2021-11-08 PROCEDURE — 83036 HEMOGLOBIN GLYCOSYLATED A1C: CPT

## 2021-11-08 PROCEDURE — 2580000003 HC RX 258: Performed by: INTERNAL MEDICINE

## 2021-11-08 PROCEDURE — 83735 ASSAY OF MAGNESIUM: CPT

## 2021-11-08 PROCEDURE — 70551 MRI BRAIN STEM W/O DYE: CPT

## 2021-11-08 PROCEDURE — 80053 COMPREHEN METABOLIC PANEL: CPT

## 2021-11-08 PROCEDURE — G0378 HOSPITAL OBSERVATION PER HR: HCPCS

## 2021-11-08 PROCEDURE — 99222 1ST HOSP IP/OBS MODERATE 55: CPT | Performed by: PSYCHIATRY & NEUROLOGY

## 2021-11-08 PROCEDURE — 96372 THER/PROPH/DIAG INJ SC/IM: CPT

## 2021-11-08 PROCEDURE — 96376 TX/PRO/DX INJ SAME DRUG ADON: CPT

## 2021-11-08 PROCEDURE — 85025 COMPLETE CBC W/AUTO DIFF WBC: CPT

## 2021-11-08 PROCEDURE — 99223 1ST HOSP IP/OBS HIGH 75: CPT | Performed by: INTERNAL MEDICINE

## 2021-11-08 PROCEDURE — 2060000000 HC ICU INTERMEDIATE R&B

## 2021-11-08 PROCEDURE — 82746 ASSAY OF FOLIC ACID SERUM: CPT

## 2021-11-08 PROCEDURE — 96374 THER/PROPH/DIAG INJ IV PUSH: CPT

## 2021-11-08 PROCEDURE — 6370000000 HC RX 637 (ALT 250 FOR IP): Performed by: PSYCHIATRY & NEUROLOGY

## 2021-11-08 PROCEDURE — 84443 ASSAY THYROID STIM HORMONE: CPT

## 2021-11-08 PROCEDURE — 70030 X-RAY EYE FOR FOREIGN BODY: CPT

## 2021-11-08 RX ORDER — ASPIRIN 81 MG/1
81 TABLET, CHEWABLE ORAL DAILY
Status: DISCONTINUED | OUTPATIENT
Start: 2021-11-08 | End: 2021-11-10 | Stop reason: HOSPADM

## 2021-11-08 RX ORDER — POTASSIUM CHLORIDE 7.45 MG/ML
10 INJECTION INTRAVENOUS PRN
Status: DISCONTINUED | OUTPATIENT
Start: 2021-11-08 | End: 2021-11-10 | Stop reason: HOSPADM

## 2021-11-08 RX ORDER — POTASSIUM CHLORIDE 20 MEQ/1
40 TABLET, EXTENDED RELEASE ORAL PRN
Status: DISCONTINUED | OUTPATIENT
Start: 2021-11-08 | End: 2021-11-10 | Stop reason: HOSPADM

## 2021-11-08 RX ADMIN — CARVEDILOL 25 MG: 6.25 TABLET, FILM COATED ORAL at 09:17

## 2021-11-08 RX ADMIN — POTASSIUM CHLORIDE 10 MEQ: 10 INJECTION, SOLUTION INTRAVENOUS at 16:12

## 2021-11-08 RX ADMIN — ASPIRIN 81 MG: 81 TABLET, CHEWABLE ORAL at 15:15

## 2021-11-08 RX ADMIN — Medication 10 ML: at 09:19

## 2021-11-08 RX ADMIN — LEVOTHYROXINE SODIUM 25 MCG: 0.03 TABLET ORAL at 05:58

## 2021-11-08 RX ADMIN — PREGABALIN 150 MG: 75 CAPSULE ORAL at 09:17

## 2021-11-08 RX ADMIN — Medication 10 ML: at 23:01

## 2021-11-08 RX ADMIN — POTASSIUM CHLORIDE 10 MEQ: 10 INJECTION, SOLUTION INTRAVENOUS at 17:20

## 2021-11-08 RX ADMIN — POTASSIUM CHLORIDE 10 MEQ: 10 INJECTION, SOLUTION INTRAVENOUS at 11:30

## 2021-11-08 RX ADMIN — ENOXAPARIN SODIUM 40 MG: 100 INJECTION SUBCUTANEOUS at 09:18

## 2021-11-08 RX ADMIN — PREGABALIN 150 MG: 75 CAPSULE ORAL at 23:01

## 2021-11-08 RX ADMIN — POTASSIUM CHLORIDE 10 MEQ: 10 INJECTION, SOLUTION INTRAVENOUS at 09:18

## 2021-11-08 RX ADMIN — LISINOPRIL 20 MG: 10 TABLET ORAL at 09:18

## 2021-11-08 RX ADMIN — FLUOXETINE HYDROCHLORIDE 20 MG: 20 CAPSULE ORAL at 09:17

## 2021-11-08 RX ADMIN — CARVEDILOL 25 MG: 6.25 TABLET, FILM COATED ORAL at 17:19

## 2021-11-08 RX ADMIN — BUMETANIDE 2 MG: 1 TABLET ORAL at 09:17

## 2021-11-08 RX ADMIN — POTASSIUM CHLORIDE 10 MEQ: 10 INJECTION, SOLUTION INTRAVENOUS at 18:30

## 2021-11-08 RX ADMIN — POTASSIUM CHLORIDE 10 MEQ: 10 INJECTION, SOLUTION INTRAVENOUS at 22:59

## 2021-11-08 RX ADMIN — POTASSIUM CHLORIDE 20 MEQ: 1500 TABLET, EXTENDED RELEASE ORAL at 09:18

## 2021-11-08 NOTE — PROGRESS NOTES
Patient admitted from ED  A&Ox4. Denies Pain. Follows commands  VSS, Telemetry monitor placed. Bed alarm activated  Orders being checked. Will con't monitor.

## 2021-11-08 NOTE — PROGRESS NOTES
Hospitalist Progress Note      SYNOPSIS: Patient admitted on 2021 for TIA and syncope.     The pt with history of HTN, Hydrocephalus, comes in to the hospital after experiencing syncopal event while at [employer] earlier today. Pt does not remember the details of the incident. Family at bedside witness the event they state the patient passed out for about 4 minutes and was not arousable. No seizure loss of bowel or bladder or biting of tongue. There was left sided weakness after the incident that lasted for some time that has since been resolved. The pt currently denies any symptoms of weakness and states he is almost back to his baseline. In Ed troponin unremarkable. CTA neck showed Less than 50% stenosis of the bilateral cervical ICAs. CTA of the head showed No evidence of large vessel occlusion.  There is moderate narrowing of a distal left M2 branch. Pt has history of hydrocephalus and history of craniotomy at [facility] in the past. Neurology consulted. EEG and MRI brain ordered,    SUBJECTIVE:    Patient seen and examined  Records reviewed. The pt denies any further episodes of weakness. He denies any dizziness or headache. Denies any other complaints. Stable overnight. No other overnight issues reported. Temp (24hrs), Av.4 °F (36.3 °C), Min:96.9 °F (36.1 °C), Max:97.7 °F (36.5 °C)    DIET: ADULT DIET; Regular  CODE: Full Code    Intake/Output Summary (Last 24 hours) at 2021 1255  Last data filed at 2021 1233  Gross per 24 hour   Intake 430 ml   Output    Net 430 ml       OBJECTIVE:    /73   Pulse 72   Temp 97.2 °F (36.2 °C) (Temporal)   Resp 18   Ht 6' (1.829 m)   Wt 259 lb (117.5 kg)   SpO2 92%   BMI 35.13 kg/m²     General appearance: No apparent distress, appears stated age and cooperative. HEENT:  Conjunctivae/corneas clear. Neck: Supple. No jugular venous distention.    Respiratory: Clear to auscultation bilaterally, normal respiratory effort  Cardiovascular: Regular rate rhythm, normal S1-S2  Abdomen: Soft, nontender, nondistended  Musculoskeletal: No clubbing, cyanosis, no bilateral lower extremity edema. Brisk capillary refill. Skin:  No rashes  on visible skin  Neurologic: awake, alert and following commands     ASSESSMENT:    Syncope  TIA? HTN  Hx of Hydrocephalus and craniotomy in the past   Hypothroidism     PLAN:    Neurology consult noted and appreciated. EEG and MRI ordered  ASA and Statin. Neuro checks every 4 hours. Orthostatics. Pt is scheduled to have Cardiac cath in coming days. .   Per family pt has had extensive workup for syncope in the past and negative stress test recently. Pt follows with Dr Senait Petty. Cardiology consulted as well as pt needs 30 days event monitor. Continue Bumex. Renal function improlved  Cont. Synthroid.      DISPOSITION:     Medications:  REVIEWED DAILY    Infusion Medications    sodium chloride       Scheduled Medications    aspirin  81 mg Oral Daily    sodium chloride flush  5-40 mL IntraVENous 2 times per day    enoxaparin  40 mg SubCUTAneous Daily    carvedilol  25 mg Oral BID WC    FLUoxetine  20 mg Oral Daily    levothyroxine  25 mcg Oral Daily    bumetanide  2 mg Oral Daily    potassium chloride  20 mEq Oral Daily    pregabalin  150 mg Oral BID    lisinopril  20 mg Oral Daily     PRN Meds: potassium chloride **OR** potassium alternative oral replacement **OR** potassium chloride, sodium chloride flush, sodium chloride, ondansetron **OR** ondansetron, polyethylene glycol, acetaminophen **OR** acetaminophen    Labs:     Recent Labs     11/07/21  1247 11/08/21  0506   WBC 6.0 6.4   HGB 13.8 13.8   HCT 39.8 39.9    241       Recent Labs     11/07/21  1247 11/08/21  0506    134   K 3.9 2.8*   CL 90* 92*   CO2 30* 28   BUN 13 10   CREATININE 1.3* 0.9   CALCIUM 9.0 8.7       Recent Labs     11/08/21  0506   PROT 6.4   ALKPHOS 48   ALT 28   AST 20   BILITOT 0.5       No results for input(s): INR in the last 72 hours. No results for input(s): Prentis Revels in the last 72 hours. Chronic labs:    Lab Results   Component Value Date    TSH 2.200 11/08/2021    LABA1C 5.2 11/08/2021       Radiology: REVIEWED DAILY    +++++++++++++++++++++++++++++++++++++++++++++++++  Anjana Barbosa MD  [facility] [place] Physician - [address] [place] [address]  +++++++++++++++++++++++++++++++++++++++++++++++++  NOTE: This report was transcribed using voice recognition software. Every effort was made to ensure accuracy; however, inadvertent computerized transcription errors may be present.

## 2021-11-08 NOTE — CONSULTS
[facility]  Neurology Consult    Date:  11/8/2021  Patient Name:  Cherise Noel  YOB: 1950  MRN: [id number]     PCP:  Alfred Hartmann MD   Referring:  No ref. provider found      Chief Complaint: loss of consciousness    History obtained from: patient, chart    Assessment  Cherise Noel is a 70 y.o. male with a history of hydrocephalus (no shunt placed) admitted with symptoms most suggestive of syncope. Given his history we will check an EEG, but there is a low suspicion for seizures based on his history. TIA vs minor stroke is a consideration, but would not explain the LOC well either. Plan  · Orthostatic vitals pending  · Routine EEG  · MRI brain w/o contrast  · ASA, statin  · Recommend outpatient 30 day cardiac event monitor        History of Present Illness:  Cherise Noel is a 70 y.o. right handed male presenting for evaluation of an episode of loss of consciousness. Patient states he initially felt a feeling like panic, seating, and felt his \"blood racing. \" This lasted for 5 minutes tops before he lost consciousness. The chart states that he was unconsciousness for approximately 4 minutes with no tongue biting or urinary incontinence. He was noted initially to have left sided weakness which since resolved. He relates a history of hydrocephalus for which he had a surgery over his right temporal region in years past which showed some elevation in pressure reportedly, but did not have shunt placed. This was initially discovered following difficulty with balance problems. He follows with a neurologist in [facility] and states that he has been doing some kind of visual and balance training program with her. Review of Systems:  As above    Medical History:   History reviewed. No pertinent past medical history. Surgical History:   History reviewed. No pertinent surgical history. Family History:   History reviewed. No pertinent family history.     Social History:  Social History     Tobacco Use    Smoking status: Never Smoker    Smokeless tobacco: Current User     Types: Chew   Substance Use Topics    Alcohol use:  Yes     Alcohol/week: 3.0 standard drinks     Types: 3 Cans of beer per week     Comment: daily    Drug use: Never        Current Medications:      Current Facility-Administered Medications   Medication Dose Route Frequency Provider Last Rate Last Admin    potassium chloride (KLOR-CON M) extended release tablet 40 mEq  40 mEq Oral PRN Mani Guerrero MD        Or    potassium bicarb-citric acid (EFFER-K) effervescent tablet 40 mEq  40 mEq Oral PRN Mani Guerrero MD        Or    potassium chloride 10 mEq/100 mL IVPB (Peripheral Line)  10 mEq IntraVENous PRN Mani Guerrero  mL/hr at 11/08/21 0918 10 mEq at 11/08/21 0918    sodium chloride flush 0.9 % injection 5-40 mL  5-40 mL IntraVENous 2 times per day Mani Guerrero MD   10 mL at 11/07/21 2339    sodium chloride flush 0.9 % injection 5-40 mL  5-40 mL IntraVENous PRN Mani Guerrero MD        0.9 % sodium chloride infusion  25 mL IntraVENous PRN Mani Guerrero MD        enoxaparin (LOVENOX) injection 40 mg  40 mg SubCUTAneous Daily Mani Guerrero MD   40 mg at 11/08/21 0918    ondansetron (ZOFRAN-ODT) disintegrating tablet 4 mg  4 mg Oral Q8H PRN Mani Guerrero MD        Or    ondansetron (ZOFRAN) injection 4 mg  4 mg IntraVENous Q6H PRN Mani Guerrero MD        polyethylene glycol (GLYCOLAX) packet 17 g  17 g Oral Daily PRRITCHIE Guerrero MD        acetaminophen (TYLENOL) tablet 650 mg  650 mg Oral Q6H PRRITCHIE Guerrero MD        Or    acetaminophen (TYLENOL) suppository 650 mg  650 mg Rectal Q6H PRN Mani Guerrero MD        carvedilol (COREG) tablet 25 mg  25 mg Oral BID WC Mani Guerrero MD   25 mg at 11/08/21 0917    FLUoxetine (PROZAC) capsule 20 mg  20 mg Oral Daily Mani Guerrero MD   20 mg at 11/08/21 0917    levothyroxine (SYNTHROID) tablet 25 mcg  25 mcg Oral Daily Michelle Shrestha MD   25 mcg at 11/08/21 0558    bumetanide (BUMEX) tablet 2 mg  2 mg Oral Daily Michelle Shrestha MD   2 mg at 11/08/21 0917    potassium chloride (KLOR-CON M) extended release tablet 20 mEq  20 mEq Oral Daily Michelle Shrestha MD   20 mEq at 11/08/21 0918    pregabalin (LYRICA) capsule 150 mg  150 mg Oral BID Michelle Shrestha MD   150 mg at 11/08/21 0917    lisinopril (PRINIVIL;ZESTRIL) tablet 20 mg  20 mg Oral Daily Michelle Shrestha MD   20 mg at 11/08/21 0918        Allergies:      No Known Allergies     Physical Examination  Vitals   Vitals:    11/07/21 2106 11/07/21 2350 11/08/21 0415 11/08/21 0745   BP: 122/68 (!) 145/66 124/77    Pulse: 68 69 74    Resp: 18 18 13 18   Temp: 97.7 °F (36.5 °C) 97.6 °F (36.4 °C) 96.9 °F (36.1 °C) 97.7 °F (36.5 °C)   TempSrc: Oral Oral Temporal Temporal   SpO2: 92% 95% 93% 94%   Weight: 259 lb (117.5 kg)      Height:            General: Patient appears in no acute distress  HEENT: Normocephalic, atraumatic  Chest: Clear to auscultation bilaterally  Heart: Regular rate and rhythm, no murmurs appreciated  Extremities: No edema or cyanosis noted    Neurologic Examination    Mental Status  Alert, and oriented to person, place and time with normal speech and language. No evidence of aphasia during conversation. No evidence of memory impairment. Attention and concentration appeared normal.     Cranial Nerves  II. Visual fields full to confrontation bilaterally. III, IV, VI: Pupils equally round and reactive to light, 4 to 3 mm bilaterally. EOMs: full, no nystagmus. V. Facial sensation intact to light touch bilaterally  VII: Facial movements symmetric and strong  VIII: Hearing intact to voice  IX,X: Palate elevates symmetrically.  No dysarthria  XI: Sternocleidomastoid and trapezius 5/5 bilaterally   XII: Tongue is midline    Motor     Right Left   Right Left   Deltoid 5 5  Hip Flexion 5 5   Biceps      5  5  Knee Extension 5 5   Triceps 5 5  Knee Flexion 5 5   Handgrip 5 5  Ankle Dorsiflexion 5 5       Ankle Plantarflexion 5 5     Tone: Normal in all four limbs    Bulk: Normal in all four limbs with no evidence of atrophy    Sensation  · Light Touch: Intact distally in all four limbs  · Pinprick: Intact distally in all four limbs  · Vibration: mildly diminished distally in LLE  · Proprioception: Intact distally in all four limbs    Reflexes     Right Left   Biceps 2 2   Brachioradialis 2 2   Triceps 2 2   Patellar 1 1   Achilles 0 0   ankle clonus none none     Toes silent bilaterally. Coordination  Rapid alternating movements normal in bilateral upper extremities  Finger to nose testing normal bilaterally  Heel to shin testing normal bilaterally    Gait  Normal base, stride, and arm swing with casual gait. 2-3 steps to turn. Labs  Results for Nomi Khan (MRN [id number]) as of 11/8/2021 09:35   Ref.  Range 11/8/2021 05:06   Sodium Latest Ref Range: 132 - 146 mmol/L 134   Potassium Latest Ref Range: 3.5 - 5.0 mmol/L 2.8 (L)   Chloride Latest Ref Range: 98 - 107 mmol/L 92 (L)   CO2 Latest Ref Range: 22 - 29 mmol/L 28   BUN Latest Ref Range: 6 - 23 mg/dL 10   Creatinine Latest Ref Range: 0.7 - 1.2 mg/dL 0.9   Anion Gap Latest Ref Range: 7 - 16 mmol/L 14   GFR Non- Latest Ref Range: >=60 mL/min/1.73 >60   GFR African American Unknown >60   Magnesium Latest Ref Range: 1.6 - 2.6 mg/dL 2.0   Glucose Latest Ref Range: 74 - 99 mg/dL 95   Calcium Latest Ref Range: 8.6 - 10.2 mg/dL 8.7   Total Protein Latest Ref Range: 6.4 - 8.3 g/dL 6.4   Albumin Latest Ref Range: 3.5 - 5.2 g/dL 3.7   Alk Phos Latest Ref Range: 40 - 129 U/L 48   ALT Latest Ref Range: 0 - 40 U/L 28   AST Latest Ref Range: 0 - 39 U/L 20   Bilirubin Latest Ref Range: 0.0 - 1.2 mg/dL 0.5   Hemoglobin A1C Latest Ref Range: 4.0 - 5.6 % 5.2   TSH Latest Ref Range: 0.270 - 4.200 uIU/mL 2.200   WBC Latest Ref Range: 4.5 - 11.5 E9/L 6.4   RBC Latest Ref Range: 3.80 - 5.80 E12/L

## 2021-11-08 NOTE — CONSULTS
INPATIENT CARDIOLOGY CONSULT    Name: Fletcher Mills    Age: 70 y.o. Date of Admission: 11/7/2021 12:31 PM    Date of Service: 11/8/2021    Reason for Consultation: Syncope, probably that of a vasodepressor mediated origin, reported hydrocephalus, hypertension, moderate obesity, hypokalemia    Referring Physician: Abel Raymundo MD    History of Present Illness: The patient is a 77-year-old white male known to [facility] with primary cardiovascular care provided exclusively during hospitalization at [facility] by Glenny Foster. There he was evaluated following a syncopal episode with the question of a vasodepressor mediated origin and echocardiogram demonstrating evidence of a normal-sized left ventricular chamber with normal left ventricular systolic function with right atrial enlargement and no valvular pathology. He additionally has undergone a functional assessment of his coronary circulation January, 2020 with a gated vasodilator myocardial perfusion imaging study demonstrating no evidence of perfusion abnormality with normal left ventricular systolic function. Additionally has a reported history of hydrocephalus with previous intracranial surgery not involving the placement of a ventriculoperitoneal shunt as well as that of hypertension and chronic lower extremity edema. His present hospitalization was prompted by a syncopal episode occurring at [facility] the day of hospitalization with his description of mild premonitory symptoms of lightheadedness and the description of his wife of both pallor and diaphoresis. He specifically denied any symptoms of anginal-like chest discomfort or other ischemic equivalents, decompensated left ventricular systolic dysfunction or volume overload nor arrhythmia related manifestations. At recommendations of leg bystanders, he was maintained in the seated position and remained reportedly unresponsive for approximately 5 minutes.   His wife related a similar episode within the past few months while seated at a restaurant. At the time of his evaluation, a resting electrocardiogram reviewed at the time of assessment demonstrated evidence of sinus bradycardia with an early precordial transition zone and was otherwise unremarkable and a CT scan was notable for the absence of acute pathology with evidence of hydrocephalus documented and right-sided maxillary, ethmoid and sphenoid sinusitis. No extracranial carotid stenosis were identified and following arrival on a nursing unit, no evidence of orthostatic hypotension documented. Subsequent magnetic resonance imaging demonstrated evidence of significant hydrocephalus unchanged from that of a previous study with a small remote right posterior circulation infarct. Laboratory studies at the time of hospitalization demonstrated mild azotemia with a serum creatinine of 1.3 mg/dL subsequently resolved associated with the development of hypokalemia in the absence of additional metabolic derangements. He has developed no recurrent symptomatology during present hospitalization. .    Review of Systems: The remainder of a complete multisystem review including consitutional, central nervous, respiratory, circulatory, gastrointestinal, genitourinary, endocrinologic, hematologic, musculoskeletal and psychiatric are negative. Past Medical History:  History reviewed. No pertinent past medical history. Past Surgical History:  History reviewed. No pertinent surgical history. Family History:  History reviewed. No pertinent family history.     Social History:  Social History     Socioeconomic History    Marital status: Unknown     Spouse name: Not on file    Number of children: Not on file    Years of education: Not on file    Highest education level: Not on file   Occupational History    Not on file   Tobacco Use    Smoking status: Never Smoker    Smokeless tobacco: Current User     Types: Chew   Substance and Sexual Activity    Alcohol use: Yes     Alcohol/week: 3.0 standard drinks     Types: 3 Cans of beer per week     Comment: daily    Drug use: Never    Sexual activity: Not on file   Other Topics Concern    Not on file   Social History Narrative    Not on file     Social Determinants of Health     Financial Resource Strain:     Difficulty of Paying Living Expenses: Not on file   Food Insecurity:     Worried About Running Out of Food in the Last Year: Not on file    Stacia of Food in the Last Year: Not on file   Transportation Needs:     Lack of Transportation (Medical): Not on file    Lack of Transportation (Non-Medical): Not on file   Physical Activity:     Days of Exercise per Week: Not on file    Minutes of Exercise per Session: Not on file   Stress:     Feeling of Stress : Not on file   Social Connections:     Frequency of Communication with Friends and Family: Not on file    Frequency of Social Gatherings with Friends and Family: Not on file    Attends Holiness Services: Not on file    Active Member of [address] [place] [address] or Organizations: Not on file    Attends Club or Organization Meetings: Not on file    Marital Status: Not on file   Intimate Partner Violence:     Fear of Current or Ex-Partner: Not on file    Emotionally Abused: Not on file    Physically Abused: Not on file    Sexually Abused: Not on file   Housing Stability:     Unable to Pay for Housing in the Last Year: Not on file    Number of [place] in the Last Year: Not on file    Unstable Housing in the Last Year: Not on file       Allergies:  No Known Allergies    Home Medications:  Prior to Admission medications    Medication Sig Start Date End Date Taking?  Authorizing Provider   bumetanide (BUMEX) 2 MG tablet Take 2 mg by mouth daily   Yes Historical Provider, MD   carvedilol (COREG) 25 MG tablet Take 25 mg by mouth 2 times daily (with meals)   Yes Historical Provider, MD   FLUoxetine (PROZAC) 20 MG capsule Take 20 mg by mouth daily   Yes Historical Provider, MD   levothyroxine (SYNTHROID) 25 MCG tablet Take 25 mcg by mouth Daily   Yes Historical Provider, MD   penicillin v potassium (VEETID) 500 MG tablet Take 500 mg by mouth 3 times daily   Yes Historical Provider, MD   potassium chloride (KLOR-CON M) 20 MEQ extended release tablet Take 20 mEq by mouth daily   Yes Historical Provider, MD   pregabalin (LYRICA) 150 MG capsule Take 150 mg by mouth 2 times daily.    Yes Historical Provider, MD   quinapril (ACCUPRIL) 20 MG tablet Take 40 mg by mouth nightly   Yes Historical Provider, MD   metOLazone (ZAROXOLYN) 2.5 MG tablet Take 2.5 mg by mouth Monday, Wednesday, Friday   Yes Historical Provider, MD   Multiple Vitamins-Minerals (THERAPEUTIC MULTIVITAMIN-MINERALS) tablet Take 1 tablet by mouth daily   Yes Historical Provider, MD   Multiple Vitamins-Minerals (PRESERVISION AREDS 2) CAPS Take 1 capsule by mouth 2 times daily   Yes Historical Provider, MD       Current Medications:  Current Facility-Administered Medications   Medication Dose Route Frequency Provider Last Rate Last Admin    potassium chloride (KLOR-CON M) extended release tablet 40 mEq  40 mEq Oral PRN Michelle Shrestha MD        Or    potassium bicarb-citric acid (EFFER-K) effervescent tablet 40 mEq  40 mEq Oral PRN Michelle Shrestha MD        Or    potassium chloride 10 mEq/100 mL IVPB (Peripheral Line)  10 mEq IntraVENous PRN Michelle Shrestha  mL/hr at 11/08/21 1612 10 mEq at 11/08/21 1612    aspirin chewable tablet 81 mg  81 mg Oral Daily Taurus Butler DO   81 mg at 11/08/21 1515    sodium chloride flush 0.9 % injection 5-40 mL  5-40 mL IntraVENous 2 times per day Michelle Shrestha MD   10 mL at 11/08/21 0919    sodium chloride flush 0.9 % injection 5-40 mL  5-40 mL IntraVENous PRN Michelle Shrestha MD        0.9 % sodium chloride infusion  25 mL IntraVENous PRN Michelle Shrestha MD        enoxaparin (LOVENOX) injection 40 mg  40 mg SubCUTAneous Daily Rene Chaudhary Luisa Hannah MD   40 mg at 11/08/21 0918    ondansetron (ZOFRAN-ODT) disintegrating tablet 4 mg  4 mg Oral Q8H PRN Doreen Zhao MD        Or    ondansetron [facility]) injection 4 mg  4 mg IntraVENous Q6H PRN Doreen Zhao MD        polyethylene glycol (GLYCOLAX) packet 17 g  17 g Oral Daily PRN Doreen Zhao MD        acetaminophen (TYLENOL) tablet 650 mg  650 mg Oral Q6H PRN Doreen Zhao MD        Or    acetaminophen (TYLENOL) suppository 650 mg  650 mg Rectal Q6H PRN Doreen Zhao MD        carvedilol (COREG) tablet 25 mg  25 mg Oral BID WC Doreen Zhao MD   25 mg at 11/08/21 0917    FLUoxetine (PROZAC) capsule 20 mg  20 mg Oral Daily Doreen Zhao MD   20 mg at 11/08/21 4970    levothyroxine (SYNTHROID) tablet 25 mcg  25 mcg Oral Daily Doreen Zhao MD   25 mcg at 11/08/21 0558    bumetanide (BUMEX) tablet 2 mg  2 mg Oral Daily Doreen Zhao MD   2 mg at 11/08/21 0917    potassium chloride (KLOR-CON M) extended release tablet 20 mEq  20 mEq Oral Daily Doreen Zhao MD   20 mEq at 11/08/21 0918    pregabalin (LYRICA) capsule 150 mg  150 mg Oral BID Doreen Zhao MD   150 mg at 11/08/21 0917    lisinopril (PRINIVIL;ZESTRIL) tablet 20 mg  20 mg Oral Daily Doreen Zhao MD   20 mg at 11/08/21 0918      sodium chloride           Physical Exam:  BP (!) 154/75   Pulse 64   Temp 98.4 °F (36.9 °C) (Axillary)   Resp 18   Ht 6' (1.829 m)   Wt 259 lb (117.5 kg)   SpO2 94%   BMI 35.13 kg/m²   Weight change: Wt Readings from Last 3 Encounters:   11/07/21 259 lb (117.5 kg)     The patient is awake, alert and in no discomfort or distress. No gross musculoskeletal deformity or lymphadenopathy are present. No significant skin or nail changes are present. Gross examination of head, eyes, nose and throat are negative. Jugular venous pressure is normal and no carotid bruits are present. No thyromegaly is noted.  Normal respiratory effort is noted with no accessory muscle usage present. Lung fields are clear to ascultation. Cardiac examination is notable for a regular rate and rhythm with no palpable thrill. No gallop rhythm or cardiac murmur are identified. A benign abdominal examination is present with the exception of obesity andno masses or organomegaly. A normal abdominal aortic pulsation is present. Intact pulses are present throughout all extremities and chronic lymphedema is present. No focal neurologic deficits are present. Intake/Output:    Intake/Output Summary (Last 24 hours) at 11/8/2021 1621  Last data filed at 11/8/2021 1233  Gross per 24 hour   Intake 635 ml   Output    Net 635 ml     No intake/output data recorded. Laboratory Tests:  Lab Results   Component Value Date    CREATININE 0.9 11/08/2021    BUN 10 11/08/2021     11/08/2021    K 2.8 (L) 11/08/2021    CL 92 (L) 11/08/2021    CO2 28 11/08/2021     No results for input(s): CKTOTAL, CKMB in the last 72 hours.     Invalid input(s): TROPONONI  No results found for: BNP  Lab Results   Component Value Date    WBC 6.4 11/08/2021    RBC 4.75 11/08/2021    HGB 13.8 11/08/2021    HCT 39.9 11/08/2021    MCV 84.0 11/08/2021    MCH 29.1 11/08/2021    MCHC 34.6 11/08/2021    RDW 13.2 11/08/2021     11/08/2021    MPV 10.9 11/08/2021     Recent Labs     11/08/21  0506   ALKPHOS 48   ALT 28   AST 20   PROT 6.4   BILITOT 0.5   LABALBU 3.7     Lab Results   Component Value Date    MG 2.0 11/08/2021     No results found for: PROTIME, INR  Lab Results   Component Value Date    TSH 2.200 11/08/2021     No components found for: CHLPL  No results found for: TRIG  No results found for: HDL  No results found for: [address]      Cardiac Tests:  ECG: A resting electrocardiogram reviewed at the time of evaluation demonstrates evidence of sinus bradycardia and is otherwise unremarkable  Telemetry findings reviewed: sinus rhythm with occasional ventricular ectopy, no new tachy/bradyarrhythmias overnight  Last Echocardiogram: An echocardiogram of May, 2021 demonstrates evidence of a normal-sized left ventricular chamber with mild concentric left ventricular perjury and normal left ventricular systolic function with mild right atrial enlargement and no significant valvular pathology  Last stress test: A gated vasodilator myocardial perfusion imaging study of January, 2020 demonstrated no evidence of perfusion ebonized with normal left ventricular systolic function      ASSESSMENT / PLAN: On a clinical basis, the patient presents following a syncopal episode with the description provided both by he and the family members observing the episode as well as his most recent prior episode suggestive of a vasodepressor mediated origin potentially exacerbated by venous pooling and exacerbated by components of his medical regimen inclusive of his antidepressant therapy as well as that of pregabalin. While peripheral edema is noted, no additional manifestations of significant diastolic heart failure present with needs of cautious monitoring of his diuretics as well as that of renal function electrolytes with his metolazone contributing to hypokalemia. Presently on the basis of present history and documentation, I see no indications for coronary angiography and will defer further recommendations in that regard to his additional cardiologist from [place], [place]. On a long-term basis, appropriate lifestyle modification to achieve weight reduction will benefit diastolic cardiac performance in the face of multiple characteristics suggestive of obstructive sleep apnea a formal assessment and initiation of nocturnal CPAP as appropriate will be essential to diastolic heart failure management as well as reducing his risk of the development of atrial arrhythmias. In addition, appropriate risk factor modification of blood pressure and serum lipids will remain essential to reducing risk of future atherosclerotic development.   Presently no additional cardiovascular assessment is anticipated during his present hospitalization with further management deferred to primary care and the nephrology service. We will further evaluate him during hospitalization should additional cardiovascular difficulties or concerns arise and if he wishes to follow exclusively with [address] [place] [address] following discharge, arrangements need to be made with the [place] office for follow-up with Jing Mahoney. Thank you for allowing me to participate in your patient's care. Please feel free to contact me if you have any questions or concerns. Note: This report was completed using computerized voice recognition software. Every effort has been made to ensure accuracy, however; inadvertent computerized transcription errors may be present. Heena Baumann.  Severo Guallpa, [facility]

## 2021-11-09 ENCOUNTER — APPOINTMENT (OUTPATIENT)
Dept: NEUROLOGY | Age: 71
DRG: 312 | End: 2021-11-09
Payer: MEDICARE

## 2021-11-09 PROBLEM — G91.1 OBSTRUCTIVE HYDROCEPHALUS (HCC): Status: ACTIVE | Noted: 2021-11-09

## 2021-11-09 LAB
ANION GAP SERPL CALCULATED.3IONS-SCNC: 11 MMOL/L (ref 7–16)
ANION GAP SERPL CALCULATED.3IONS-SCNC: 11 MMOL/L (ref 7–16)
BUN BLDV-MCNC: 10 MG/DL (ref 6–23)
BUN BLDV-MCNC: 9 MG/DL (ref 6–23)
CALCIUM SERPL-MCNC: 8.4 MG/DL (ref 8.6–10.2)
CALCIUM SERPL-MCNC: 9.3 MG/DL (ref 8.6–10.2)
CHLORIDE BLD-SCNC: 100 MMOL/L (ref 98–107)
CHLORIDE BLD-SCNC: 98 MMOL/L (ref 98–107)
CO2: 28 MMOL/L (ref 22–29)
CO2: 30 MMOL/L (ref 22–29)
CREAT SERPL-MCNC: 0.9 MG/DL (ref 0.7–1.2)
CREAT SERPL-MCNC: 1 MG/DL (ref 0.7–1.2)
GFR AFRICAN AMERICAN: >60
GFR AFRICAN AMERICAN: >60
GFR NON-AFRICAN AMERICAN: >60 ML/MIN/1.73
GFR NON-AFRICAN AMERICAN: >60 ML/MIN/1.73
GLUCOSE BLD-MCNC: 91 MG/DL (ref 74–99)
GLUCOSE BLD-MCNC: 98 MG/DL (ref 74–99)
POTASSIUM SERPL-SCNC: 3.3 MMOL/L (ref 3.5–5)
POTASSIUM SERPL-SCNC: 4.1 MMOL/L (ref 3.5–5)
SODIUM BLD-SCNC: 139 MMOL/L (ref 132–146)
SODIUM BLD-SCNC: 139 MMOL/L (ref 132–146)
TROPONIN, HIGH SENSITIVITY: 10 NG/L (ref 0–11)

## 2021-11-09 PROCEDURE — G0378 HOSPITAL OBSERVATION PER HR: HCPCS

## 2021-11-09 PROCEDURE — 6370000000 HC RX 637 (ALT 250 FOR IP): Performed by: PSYCHIATRY & NEUROLOGY

## 2021-11-09 PROCEDURE — 2060000000 HC ICU INTERMEDIATE R&B

## 2021-11-09 PROCEDURE — 6370000000 HC RX 637 (ALT 250 FOR IP): Performed by: INTERNAL MEDICINE

## 2021-11-09 PROCEDURE — 80048 BASIC METABOLIC PNL TOTAL CA: CPT

## 2021-11-09 PROCEDURE — 36415 COLL VENOUS BLD VENIPUNCTURE: CPT

## 2021-11-09 PROCEDURE — 96372 THER/PROPH/DIAG INJ SC/IM: CPT

## 2021-11-09 PROCEDURE — 84484 ASSAY OF TROPONIN QUANT: CPT

## 2021-11-09 PROCEDURE — 99232 SBSQ HOSP IP/OBS MODERATE 35: CPT | Performed by: INTERNAL MEDICINE

## 2021-11-09 PROCEDURE — 99232 SBSQ HOSP IP/OBS MODERATE 35: CPT | Performed by: PHYSICIAN ASSISTANT

## 2021-11-09 PROCEDURE — 2580000003 HC RX 258: Performed by: INTERNAL MEDICINE

## 2021-11-09 PROCEDURE — 95816 EEG AWAKE AND DROWSY: CPT | Performed by: PSYCHIATRY & NEUROLOGY

## 2021-11-09 PROCEDURE — 99222 1ST HOSP IP/OBS MODERATE 55: CPT | Performed by: NEUROLOGICAL SURGERY

## 2021-11-09 PROCEDURE — 6360000002 HC RX W HCPCS: Performed by: INTERNAL MEDICINE

## 2021-11-09 PROCEDURE — 95816 EEG AWAKE AND DROWSY: CPT

## 2021-11-09 RX ADMIN — POTASSIUM CHLORIDE 20 MEQ: 1500 TABLET, EXTENDED RELEASE ORAL at 09:34

## 2021-11-09 RX ADMIN — PREGABALIN 150 MG: 75 CAPSULE ORAL at 09:35

## 2021-11-09 RX ADMIN — Medication 10 ML: at 21:37

## 2021-11-09 RX ADMIN — ENOXAPARIN SODIUM 40 MG: 100 INJECTION SUBCUTANEOUS at 09:35

## 2021-11-09 RX ADMIN — CARVEDILOL 25 MG: 6.25 TABLET, FILM COATED ORAL at 16:11

## 2021-11-09 RX ADMIN — LISINOPRIL 20 MG: 10 TABLET ORAL at 09:35

## 2021-11-09 RX ADMIN — FLUOXETINE HYDROCHLORIDE 20 MG: 20 CAPSULE ORAL at 09:34

## 2021-11-09 RX ADMIN — LEVOTHYROXINE SODIUM 25 MCG: 0.03 TABLET ORAL at 06:07

## 2021-11-09 RX ADMIN — PREGABALIN 150 MG: 75 CAPSULE ORAL at 21:37

## 2021-11-09 RX ADMIN — CARVEDILOL 25 MG: 6.25 TABLET, FILM COATED ORAL at 09:35

## 2021-11-09 RX ADMIN — BUMETANIDE 2 MG: 1 TABLET ORAL at 09:34

## 2021-11-09 RX ADMIN — Medication 10 ML: at 09:36

## 2021-11-09 RX ADMIN — ASPIRIN 81 MG: 81 TABLET, CHEWABLE ORAL at 09:35

## 2021-11-09 RX ADMIN — POTASSIUM CHLORIDE 40 MEQ: 1500 TABLET, EXTENDED RELEASE ORAL at 09:35

## 2021-11-09 ASSESSMENT — ENCOUNTER SYMPTOMS
BACK PAIN: 0
PHOTOPHOBIA: 0
SHORTNESS OF BREATH: 0
TROUBLE SWALLOWING: 0
ABDOMINAL PAIN: 0

## 2021-11-09 NOTE — PROCEDURES
[address] Report    MRN: [id number]   PATIENT NAME: Alyssa Rodriguez   DATE OF REPORT: 2021    DATE OF SERVICE: 2021    PHYSICIAN NAME: Son Evans DO  Referring Physician: Pascale Iniguez      Patient's : 1950   Patient's Age: 70 y.o. Gender: male     PROCEDURE: Routine EEG with video      Clinical Interpretation: This was a normal study during waking and drowsiness. No seizures or epileptiform discharges were noted during this study. ____________________________  Electronically signed by: Son Evans DO, 2021 11:04 AM      Patient Clinical Information   Reason for Study: Patient undergoing evaluation for episode of altered mental status  Patient State: Awake  Primary neurological diagnosis: Spell of uncertain etiology   Primary indication for monitoring: Characterization of spells    Pertinent Medications and Treatments    Fluoxetine    Levothyroxine    pregabalin     Sedatives administered: No  Intubated: No  Pharmacological paralytic: No    Reporting Period  Start of Study: 1006, 2021   End of Study:  103, 2021       EEG Description  Digital video and scalp EEG monitoring was performed using the standard protocol for this laboratory. Scalp electrodes were applied in the international 10/20 system. Multiple digital montage arrangements were utilized for evaluation. EKG and video were recorded. Background:      Occipital rhythm (posterior dominant rhythm or PDR): Present   Frequency: 9 Hz  Voltage: Medium   Organization: fair   Reactivity to eye opening/closure: fair    Drowsiness: Present - normal  Sleep: Absent    Technical and Activation Procedures:  Hyperventilation: Not done        Photic stimulation: Done - physiologic driving noted        Reactivity to stimulation: Yes    Abnormalities:    I. Seizures? No    II. Rhythmic or Periodic Patterns? No    III. Other Abnormalities? No

## 2021-11-09 NOTE — PROGRESS NOTES
Hospitalist Progress Note      SYNOPSIS: Patient admitted on 2021 for TIA and syncope.     The pt with history of HTN, Hydrocephalus, comes in to the hospital after experiencing syncopal event while at [employer] earlier today. Pt does not remember the details of the incident. Family at bedside witness the event they state the patient passed out for about 4 minutes and was not arousable. No seizure loss of bowel or bladder or biting of tongue. There was left sided weakness after the incident that lasted for some time that has since been resolved. The pt currently denies any symptoms of weakness and states he is almost back to his baseline. In Ed troponin unremarkable. CTA neck showed Less than 50% stenosis of the bilateral cervical ICAs. CTA of the head showed No evidence of large vessel occlusion.  There is moderate narrowing of a distal left M2 branch. Pt has history of hydrocephalus and history of cr[facility]otomy at [facility] in the past. Neurology consulted. EEG and MRI brain ordered,    SUBJECTIVE:    Patient seen and examined  Records reviewed. 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 Stable overnight. No other overnight issues reported. Temp (24hrs), Av.7 °F (36.5 °C), Min:97.2 °F (36.2 °C), Max:98.4 °F (36.9 °C)    DIET: ADULT DIET; Regular; No Added Salt (3-4 gm)  CODE: Full Code    Intake/Output Summary (Last 24 hours) at 2021 0943  Last data filed at 2021 0608  Gross per 24 hour   Intake 1365 ml   Output    Net 1365 ml       OBJECTIVE:    /85   Pulse 78   Temp 97.4 °F (36.3 °C) (Temporal)   Resp 18   Ht 6' (1.829 m)   Wt 259 lb (117.5 kg)   SpO2 94%   BMI 35.13 kg/m²     General appearance: No apparent distress, appears stated age and cooperative. HEENT:  Conjunctivae/corneas clear. Neck: Supple.  No jugular venous distention. Respiratory: Clear to auscultation bilaterally, normal respiratory effort  Cardiovascular: Regular rate rhythm, normal S1-S2  Abdomen: Soft, nontender, nondistended  Musculoskeletal: No clubbing, cyanosis, no bilateral lower extremity edema. Brisk capillary refill. Skin:  No rashes  on visible skin  Neurologic: awake, alert and following commands     ASSESSMENT:    Syncope  TIA? HTN  Hx of Hydrocephalus and craniotomy in the past   Hypothroidism     PLAN:    Neurology consult noted and appreciated. EEG and MRI ordered  ASA and Statin. Ambulatory vitals and Orthostatics. PT OT if needed  Pt is scheduled to have Cardiac cath in coming days. .   Per family pt has had extensive workup for syncope in the past and negative stress test recently. Pt follows with Dr Junaid Bruner. Cardiology consulted as well as pt needs 30 days event monitor. Continue Bumex. Renal function improlved  Cont. Synthroid.    Will seek Neurosurgery eval     DISPOSITION: c/w intermediate    Medications:  REVIEWED DAILY    Infusion Medications    sodium chloride       Scheduled Medications    aspirin  81 mg Oral Daily    sodium chloride flush  5-40 mL IntraVENous 2 times per day    enoxaparin  40 mg SubCUTAneous Daily    carvedilol  25 mg Oral BID WC    FLUoxetine  20 mg Oral Daily    levothyroxine  25 mcg Oral Daily    bumetanide  2 mg Oral Daily    potassium chloride  20 mEq Oral Daily    pregabalin  150 mg Oral BID    lisinopril  20 mg Oral Daily     PRN Meds: potassium chloride **OR** potassium alternative oral replacement **OR** potassium chloride, sodium chloride flush, sodium chloride, ondansetron **OR** ondansetron, polyethylene glycol, acetaminophen **OR** acetaminophen    Labs:     Recent Labs     11/07/21  1247 11/08/21  0506   WBC 6.0 6.4   HGB 13.8 13.8   HCT 39.8 39.9    241       Recent Labs     11/07/21  1247 11/08/21  0506 11/09/21  0454    134 139   K 3.9 2.8* 3.3*   CL 90* 92* 98 CO2 30* 28 30*   BUN 13 10 10   CREATININE 1.3* 0.9 0.9   CALCIUM 9.0 8.7 8.4*       Recent Labs     11/08/21  0506   PROT 6.4   ALKPHOS 48   ALT 28   AST 20   BILITOT 0.5       No results for input(s): INR in the last 72 hours. No results for input(s): [place] Perkins in the last 72 hours. Chronic labs:    Lab Results   Component Value Date    TSH 2.200 11/08/2021    LABA1C 5.2 11/08/2021       Radiology: REVIEWED DAILY    +++++++++++++++++++++++++++++++++++++++++++++++++  Yenifer Rivera MD  [facility] - [address] [place] [address]  +++++++++++++++++++++++++++++++++++++++++++++++++  NOTE: This report was transcribed using voice recognition software. Every effort was made to ensure accuracy; however, inadvertent computerized transcription errors may be present.

## 2021-11-09 NOTE — PROGRESS NOTES
John Castillo is a 70 y.o.  male     He is following for loss of consciousness. He has a past medical history of hydrocephalus (no shunt). He presented after an episode of loss of consciousness. Initially he felt like he was feeling panic, sweating and has blood racing. His loss of consciousness lasted approximately 4 minutes. There is no tongue biting or urinary incontinence. MRI of the brain showed his known history of hydrocephalus. EEG with no significant findings. He is planned to have an event monitor with cardiology.      No chest pain or palpitations  No SOB  No vertigo, lightheadedness or loss of consciousness  No falls, tripping or stumbling  No incontinence of bowels or bladder  No itching or bruising appreciated  No numbness, tingling or focal arm/leg weakness    ROS otherwise negative     No Known Allergies      Objective:       /85   Pulse 78   Temp 97.4 °F (36.3 °C) (Temporal)   Resp 18   Ht 6' (1.829 m)   Wt 259 lb (117.5 kg)   SpO2 94%   BMI 35.13 kg/m²     General: Patient appears in no acute distress  HEENT: Normocephalic, atraumatic  Chest: Clear to auscultation bilaterally  Heart: Regular rate and rhythm, no murmurs appreciated  Extremities: No edema or cyanosis noted    Mental Status: Alert, oriented, thought content appropriate     Appropriate attention/concentration  Intact fundus of knowledge  Repetition intact  Intact memories    Speech: Clear   Language: Appropriate     Cranial Nerves:  I: smell    II: visual acuity     II: visual fields Full to confrontation   II: pupils PER   III,VII: ptosis None   III,IV,VI: extraocular muscles  Full ROM   V: mastication Normal   V: facial light touch sensation  Normal   V,VII: corneal reflex     VII: facial muscle function - upper  Normal   VII: facial muscle function - lower Normal   VIII: hearing Normal   IX: soft palate elevation  Normal   IX,X: gag reflex    XI: trapezius strength  5/5   XI: sternocleidomastoid strength 5/5   XI: neck extension strength  5/5   XII: tongue strength  Normal     Motor:  5/5 throughout   Normal tone and bulk   No abnormal movements     Sensory:  LT intact distally in all limbs     Coordination:   FFM, FNF intact b/l     DTR:     No Babinski    Laboratory/Radiology:     CBC with Differential:    Lab Results   Component Value Date    WBC 6.4 11/08/2021    RBC 4.75 11/08/2021    HGB 13.8 11/08/2021    HCT 39.9 11/08/2021     11/08/2021    MCV 84.0 11/08/2021    MCH 29.1 11/08/2021    MCHC 34.6 11/08/2021    RDW 13.2 11/08/2021    LYMPHOPCT 26.2 11/08/2021    MONOPCT 13.0 11/08/2021    BASOPCT 0.9 11/08/2021    MONOSABS 0.83 11/08/2021    LYMPHSABS 1.67 11/08/2021    EOSABS 0.30 11/08/2021    BASOSABS 0.06 11/08/2021     CMP:    Lab Results   Component Value Date     11/09/2021    K 3.3 11/09/2021    K 2.8 11/08/2021    CL 98 11/09/2021    CO2 30 11/09/2021    BUN 10 11/09/2021    CREATININE 0.9 11/09/2021    GFRAA >60 11/09/2021    LABGLOM >60 11/09/2021    GLUCOSE 91 11/09/2021    PROT 6.4 11/08/2021    LABALBU 3.7 11/08/2021    CALCIUM 8.4 11/09/2021    BILITOT 0.5 11/08/2021    ALKPHOS 48 11/08/2021    AST 20 11/08/2021    ALT 28 11/08/2021     MRI brain   1. 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  2. No evidence of restricted diffusion to suggest acute infarct. 3. 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     EEG  No seizure activity or epileptiform discharges     I personally reviewed all labs and images  Assessment:     Syncope   MRI brain with evidence of known hydrocephalus-neurosurgery evaluated   EEG unremarkable    Plan:     Follow-up with cardiology as outpatient as planned    Okay to discharge from neurology point of view, will sign off    Please call with questions or concerns      Jess Garcia PA-C  3:10 PM  11/9/2021

## 2021-11-09 NOTE — PLAN OF CARE
Problem: Falls - Risk of:  Goal: Will remain free from falls  Description: Will remain free from falls  Outcome: Met This Shift  Goal: Absence of physical injury  Description: Absence of physical injury  Outcome: Met This Shift     Problem: Fluid Volume:  Goal: Ability to achieve a balanced intake and output will improve  Description: Ability to achieve a balanced intake and output will improve  Outcome: Met This Shift     Problem: Physical Regulation:  Goal: Ability to maintain clinical measurements within normal limits will improve  Description: Ability to maintain clinical measurements within normal limits will improve  Outcome: Met This Shift     Problem: Physical Regulation:  Goal: Will show no signs and symptoms of electrolyte imbalance  Description: Will show no signs and symptoms of electrolyte imbalance  Outcome: Ongoing  Note: Hypokalemic, IV Potassium replacement administered

## 2021-11-09 NOTE — CONSULTS
NEUROSURGERY CONSULTATION     Chief Complaint: NPH    HPI:   Nurys Rock is a 70 y.o.  male who presents to the hospital after a syncopal episode. Pt states he felt dizzy prior to the incident and does not remember anything after that. He admits to left sided weakness that has since resolved. Pt has hx known NPH that has been managed at [facility]. Pt has not had recent follow up, but states he has not had any issues. Denies loss of bowel or bladder, saddle anesthesia, memory issues, gait abnormality, fever, chills, SOB, or chest pain. History reviewed. No pertinent past medical history. History reviewed. No pertinent surgical history. History reviewed. No pertinent family history. Social History     Socioeconomic History    Marital status: Unknown     Spouse name: Not on file    Number of children: Not on file    Years of education: Not on file    Highest education level: Not on file   Occupational History    Not on file   Tobacco Use    Smoking status: Never Smoker    Smokeless tobacco: Current User     Types: Chew   Substance and Sexual Activity    Alcohol use: Yes     Alcohol/week: 3.0 standard drinks     Types: 3 Cans of beer per week     Comment: daily    Drug use: Never    Sexual activity: Not on file   Other Topics Concern    Not on file   Social History Narrative    Not on file     Social Determinants of Health     Financial Resource Strain:     Difficulty of Paying Living Expenses: Not on file   Food Insecurity:     Worried About Running Out of Food in the Last Year: Not on file    Stacia of Food in the Last Year: Not on file   Transportation Needs:     Lack of Transportation (Medical): Not on file    Lack of Transportation (Non-Medical):  Not on file   Physical Activity:     Days of Exercise per Week: Not on file    Minutes of Exercise per Session: Not on file   Stress:     Feeling of Stress : Not on file   Social Connections:     Frequency of Communication with Friends and Family: Not on file    Frequency of Social Gatherings with Friends and Family: Not on file    Attends Jehovah's witness Services: Not on file    Active Member of Clubs or Organizations: Not on file    Attends Club or Organization Meetings: Not on file    Marital Status: Not on file   Intimate Partner Violence:     Fear of Current or Ex-Partner: Not on file    Emotionally Abused: Not on file    Physically Abused: Not on file    Sexually Abused: Not on file   Housing Stability:     Unable to Pay for Housing in the Last Year: Not on file    Number of [place] in the Last Year: Not on file    Unstable Housing in the Last Year: Not on file       Medications:   Current Facility-Administered Medications   Medication Dose Route Frequency Provider Last Rate Last Admin    potassium chloride (KLOR-CON M) extended release tablet 40 mEq  40 mEq Oral PRN Gilles Marshall MD   40 mEq at 11/09/21 0935    Or    potassium bicarb-citric acid (EFFER-K) effervescent tablet 40 mEq  40 mEq Oral PRN Gilles Marshall MD        Or    potassium chloride 10 mEq/100 mL IVPB (Peripheral Line)  10 mEq IntraVENous PRN Gilles Marshall  mL/hr at 11/08/21 2259 10 mEq at 11/08/21 2259    aspirin chewable tablet 81 mg  81 mg Oral Daily Taurus Butler DO   81 mg at 11/09/21 0935    sodium chloride flush 0.9 % injection 5-40 mL  5-40 mL IntraVENous 2 times per day Gilles Marshall MD   10 mL at 11/09/21 0936    sodium chloride flush 0.9 % injection 5-40 mL  5-40 mL IntraVENous PRN Gilles Marshall MD        0.9 % sodium chloride infusion  25 mL IntraVENous PRN Gilles Marshall MD        enoxaparin (LOVENOX) injection 40 mg  40 mg SubCUTAneous Daily Gilles Marshall MD   40 mg at 11/09/21 0935    ondansetron (ZOFRAN-ODT) disintegrating tablet 4 mg  4 mg Oral Q8H PRN Gilles Marshall MD        Or    ondansetron (ZOFRAN) injection 4 mg  4 mg IntraVENous Q6H PRN Gilles Marshall MD        polyethylene glycol (GLYCOLAX) packet 17 g 17 g Oral Daily PRN Marbella Solorzano MD        acetaminophen (TYLENOL) tablet 650 mg  650 mg Oral Q6H PRN Marbella Solorzano MD        Or    acetaminophen (TYLENOL) suppository 650 mg  650 mg Rectal Q6H PRN Marbella Solorzano MD        carvedilol (COREG) tablet 25 mg  25 mg Oral BID  Marbella Solorzano MD   25 mg at 11/09/21 0935    FLUoxetine (PROZAC) capsule 20 mg  20 mg Oral Daily Marbella Solorzano MD   20 mg at 11/09/21 0934    levothyroxine (SYNTHROID) tablet 25 mcg  25 mcg Oral Daily Marbella Solorzano MD   25 mcg at 11/09/21 0607    bumetanide (BUMEX) tablet 2 mg  2 mg Oral Daily Marbella Solorzano MD   2 mg at 11/09/21 0934    potassium chloride (KLOR-CON M) extended release tablet 20 mEq  20 mEq Oral Daily Marbella Solorzano MD   20 mEq at 11/09/21 0934    pregabalin (LYRICA) capsule 150 mg  150 mg Oral BID Marbella Solorzano MD   150 mg at 11/09/21 0935    lisinopril (PRINIVIL;ZESTRIL) tablet 20 mg  20 mg Oral Daily Marbella Solorzano MD   20 mg at 11/09/21 0935        Allergies:    Patient has no known allergies. Review of Systems   Constitutional: Negative for fever. HENT: Negative for trouble swallowing. Eyes: Negative for photophobia. Respiratory: Negative for shortness of breath. Cardiovascular: Negative for chest pain. Gastrointestinal: Negative for abdominal pain. Endocrine: Negative for heat intolerance. Genitourinary: Negative for flank pain, frequency and urgency. Musculoskeletal: Negative for back pain, gait problem, myalgias and neck pain. Skin: Negative for wound. Neurological: Positive for dizziness, weakness and headaches. Psychiatric/Behavioral: Negative for confusion. Physical Exam  Constitutional:       Appearance: Normal appearance. He is well-developed. HENT:      Head: Normocephalic and atraumatic. Eyes:      Extraocular Movements: Extraocular movements intact.       Conjunctiva/sclera: Conjunctivae normal.      Pupils: Pupils are equal, round, and reactive to light. Cardiovascular:      Rate and Rhythm: Normal rate. Pulmonary:      Effort: Pulmonary effort is normal.   Abdominal:      General: There is no distension. Musculoskeletal:      Cervical back: Normal range of motion and neck supple. Skin:     General: Skin is warm and dry. Neurological:      Mental Status: He is alert. Comments: Alert and oriented x3  CN3-12 intact  Motor strength full  Sensation intact to light touch     Psychiatric:         Thought Content: Thought content normal.          /85   Pulse 78   Temp 97.4 °F (36.3 °C) (Temporal)   Resp 18   Ht 6' (1.829 m)   Wt 259 lb (117.5 kg)   SpO2 94%   BMI 35.13 kg/m²        Assessment:   Known NPH  Syncopal episode    Plan:  -Pt is not having symptoms correlating to NPH. If he does develop these symptoms, recommend following up with surgeon at [facility]   -Cardiology following      Electronically signed by Carrie Weinberg PA-C on 11/9/2021 at 1:36 PM     I have interviewed and examined the patient and agree with above. He follows with Dr. Melanie Davison  (neurosurgeon) at [facility]. He has had an endoscopic third ventriculostomy. No intervention planned at this time.   F/U at [facility] on discharge    Loulou Caro MD

## 2021-11-09 NOTE — PROGRESS NOTES
Notified by nursing staff that patient had episode of ventricular tachycardia with earlier documented hypokalemia with supplementation. The patient was asymptomatic of the episode with no additional identified arrhythmias. The arrhythmia was verified by review of arrhythmia monitoring strips. His existing medical regimen will be maintained with needs of repeat assessment of serum chemistries prior to consideration of additional assessment or intervention with present plans of maintenance of his existing beta-blocker.

## 2021-11-10 ENCOUNTER — APPOINTMENT (OUTPATIENT)
Dept: NON INVASIVE DIAGNOSTICS | Age: 71
DRG: 312 | End: 2021-11-10
Payer: MEDICARE

## 2021-11-10 ENCOUNTER — APPOINTMENT (OUTPATIENT)
Dept: NUCLEAR MEDICINE | Age: 71
DRG: 312 | End: 2021-11-10
Payer: MEDICARE

## 2021-11-10 VITALS
HEART RATE: 67 BPM | SYSTOLIC BLOOD PRESSURE: 192 MMHG | RESPIRATION RATE: 18 BRPM | BODY MASS INDEX: 35.08 KG/M2 | TEMPERATURE: 97.7 F | OXYGEN SATURATION: 96 % | DIASTOLIC BLOOD PRESSURE: 97 MMHG | HEIGHT: 72 IN | WEIGHT: 259 LBS

## 2021-11-10 DIAGNOSIS — I47.20 VENTRICULAR TACHYCARDIA: Primary | ICD-10-CM

## 2021-11-10 LAB
ANION GAP SERPL CALCULATED.3IONS-SCNC: 9 MMOL/L (ref 7–16)
BUN BLDV-MCNC: 9 MG/DL (ref 6–23)
CALCIUM SERPL-MCNC: 8.5 MG/DL (ref 8.6–10.2)
CHLORIDE BLD-SCNC: 99 MMOL/L (ref 98–107)
CO2: 29 MMOL/L (ref 22–29)
CREAT SERPL-MCNC: 0.8 MG/DL (ref 0.7–1.2)
EKG ATRIAL RATE: 53 BPM
EKG P AXIS: 52 DEGREES
EKG P-R INTERVAL: 158 MS
EKG Q-T INTERVAL: 476 MS
EKG QRS DURATION: 92 MS
EKG QTC CALCULATION (BAZETT): 446 MS
EKG R AXIS: 61 DEGREES
EKG T AXIS: 73 DEGREES
EKG VENTRICULAR RATE: 53 BPM
GFR AFRICAN AMERICAN: >60
GFR NON-AFRICAN AMERICAN: >60 ML/MIN/1.73
GLUCOSE BLD-MCNC: 94 MG/DL (ref 74–99)
LV EF: 64 %
LVEF MODALITY: NORMAL
MAGNESIUM: 1.8 MG/DL (ref 1.6–2.6)
POTASSIUM SERPL-SCNC: 3.5 MMOL/L (ref 3.5–5)
SODIUM BLD-SCNC: 137 MMOL/L (ref 132–146)

## 2021-11-10 PROCEDURE — 97161 PT EVAL LOW COMPLEX 20 MIN: CPT

## 2021-11-10 PROCEDURE — 36415 COLL VENOUS BLD VENIPUNCTURE: CPT

## 2021-11-10 PROCEDURE — 99233 SBSQ HOSP IP/OBS HIGH 50: CPT | Performed by: INTERNAL MEDICINE

## 2021-11-10 PROCEDURE — 97530 THERAPEUTIC ACTIVITIES: CPT

## 2021-11-10 PROCEDURE — 83735 ASSAY OF MAGNESIUM: CPT

## 2021-11-10 PROCEDURE — 78452 HT MUSCLE IMAGE SPECT MULT: CPT

## 2021-11-10 PROCEDURE — 80048 BASIC METABOLIC PNL TOTAL CA: CPT

## 2021-11-10 PROCEDURE — 6370000000 HC RX 637 (ALT 250 FOR IP): Performed by: INTERNAL MEDICINE

## 2021-11-10 PROCEDURE — 6370000000 HC RX 637 (ALT 250 FOR IP): Performed by: PSYCHIATRY & NEUROLOGY

## 2021-11-10 PROCEDURE — 2580000003 HC RX 258: Performed by: INTERNAL MEDICINE

## 2021-11-10 PROCEDURE — 93017 CV STRESS TEST TRACING ONLY: CPT

## 2021-11-10 PROCEDURE — 6360000002 HC RX W HCPCS: Performed by: INTERNAL MEDICINE

## 2021-11-10 PROCEDURE — 78452 HT MUSCLE IMAGE SPECT MULT: CPT | Performed by: INTERNAL MEDICINE

## 2021-11-10 PROCEDURE — 3430000000 HC RX DIAGNOSTIC RADIOPHARMACEUTICAL: Performed by: RADIOLOGY

## 2021-11-10 PROCEDURE — A9500 TC99M SESTAMIBI: HCPCS | Performed by: RADIOLOGY

## 2021-11-10 PROCEDURE — G0378 HOSPITAL OBSERVATION PER HR: HCPCS

## 2021-11-10 PROCEDURE — 93010 ELECTROCARDIOGRAM REPORT: CPT | Performed by: INTERNAL MEDICINE

## 2021-11-10 RX ORDER — FLUTICASONE PROPIONATE 50 MCG
1 SPRAY, SUSPENSION (ML) NASAL DAILY
Qty: 16 G | Refills: 0 | Status: SHIPPED | OUTPATIENT
Start: 2021-11-10

## 2021-11-10 RX ORDER — POTASSIUM CHLORIDE 20 MEQ/1
40 TABLET, EXTENDED RELEASE ORAL DAILY
Qty: 60 TABLET | Refills: 3 | Status: SHIPPED | OUTPATIENT
Start: 2021-11-11

## 2021-11-10 RX ORDER — POTASSIUM CHLORIDE 20 MEQ/1
20 TABLET, EXTENDED RELEASE ORAL ONCE
Status: COMPLETED | OUTPATIENT
Start: 2021-11-10 | End: 2021-11-10

## 2021-11-10 RX ORDER — POTASSIUM CHLORIDE 20 MEQ/1
40 TABLET, EXTENDED RELEASE ORAL DAILY
Status: DISCONTINUED | OUTPATIENT
Start: 2021-11-11 | End: 2021-11-10 | Stop reason: HOSPADM

## 2021-11-10 RX ADMIN — CARVEDILOL 25 MG: 6.25 TABLET, FILM COATED ORAL at 17:10

## 2021-11-10 RX ADMIN — POTASSIUM CHLORIDE 20 MEQ: 1500 TABLET, EXTENDED RELEASE ORAL at 17:10

## 2021-11-10 RX ADMIN — CARVEDILOL 25 MG: 6.25 TABLET, FILM COATED ORAL at 08:41

## 2021-11-10 RX ADMIN — FLUOXETINE HYDROCHLORIDE 20 MG: 20 CAPSULE ORAL at 14:19

## 2021-11-10 RX ADMIN — POTASSIUM CHLORIDE 20 MEQ: 1500 TABLET, EXTENDED RELEASE ORAL at 14:20

## 2021-11-10 RX ADMIN — BUMETANIDE 2 MG: 1 TABLET ORAL at 14:20

## 2021-11-10 RX ADMIN — REGADENOSON 0.4 MG: 0.08 INJECTION, SOLUTION INTRAVENOUS at 10:55

## 2021-11-10 RX ADMIN — Medication 33 MILLICURIE: at 10:59

## 2021-11-10 RX ADMIN — PREGABALIN 150 MG: 75 CAPSULE ORAL at 14:19

## 2021-11-10 RX ADMIN — Medication 10 ML: at 08:41

## 2021-11-10 RX ADMIN — LISINOPRIL 20 MG: 10 TABLET ORAL at 14:20

## 2021-11-10 RX ADMIN — Medication 11.5 MILLICURIE: at 09:21

## 2021-11-10 RX ADMIN — ASPIRIN 81 MG: 81 TABLET, CHEWABLE ORAL at 14:20

## 2021-11-10 ASSESSMENT — PAIN SCALES - GENERAL: PAINLEVEL_OUTOF10: 0

## 2021-11-10 NOTE — CARE COORDINATION
SW spoke with patient in room. He states he lives with his wife in a 1 story home with a basement that they do not use. He states he has a walker, cane and wheelchair if needed. PCP is Dr Everton Oliveira, pharmacy is [facility] [place] [facility] No history of HHC or AMY. We discussed transition of care, plan is home, likely today. He reports he feels he is at his baseline and does not have any needs. He states he is going to follow up with his Neurosurgeon in [facility] soon as an outpatient. No needs noted.

## 2021-11-10 NOTE — PROCEDURES
Lexiscan stress report    Indication: Chest pain. They were infused with Lexiscan 0.4 mg bolus followed by Cardiolite bolus. Resting ECG: Sinus rhythm, 61 bpm.  Normal axis. 1 PVC. Stress ECG: No ischemic changes. Assessment:  Nondiagnostic stress ECG for ischemia. Perfusion imaging pending.

## 2021-11-10 NOTE — PROGRESS NOTES
Perfusion imaging reviewed and demonstrates no evidence of perfusion abnormalities with normal left ventricular systolic function suggesting a low likelihood of significant coronary atherosclerosis and a low risk of acute ischemic events. No additional arrhythmia related events have been reported with recurrent hypokalemia presently noted and recommended modification of his potassium supplement dosage to reduce risk of arrhythmia related events. Additional arrhythmia monitoring is recommended to assess the presence or absence of additional ventricular arrhythmias requiring further management. We will further evaluate him during hospitalization should additional cardiovascular difficulties or concerns arise with arrangements made for outpatient follow-up with his primary cardiologist, Ulus Lennox in approximately 1 month.

## 2021-11-10 NOTE — PROGRESS NOTES
Went over discharge instructions and importance of follow-up with pt.'s neurosurgeon , family , and cardiologist  For placement of  event monitor with pt. And family.

## 2021-11-10 NOTE — PROGRESS NOTES
Physical Therapy  Physical Therapy Initial Assessment     Name: Musa Gan  : 1950  MRN: [id number]      Date of Service: 11/10/2021    Evaluating PT:  Gloria Maynard, PT, DPT [id number]     Room #:  3118/0719-H  Diagnosis:  Syncope and collapse [R55]  TIA (transient ischemic attack) [G45.9]  Reason for admission: LOC, Syncopal Episode  Precautions:  Falls  Procedure/Surgery:  N/A  Equipment Recommendations:  None    SUBJECTIVE:  Pt lives with wife in a 1 story home with a ramp to enter. Bed is on 1 floor and bath is on 1 floor. Pt ambulated independently PTA. Pt with a history of L foot drop s/p L knee replacement. Pt does not wear an orthotic for it. OBJECTIVE:   Initial Evaluation  Date:    AM-PAC 6 Clicks /   Was pt agreeable to Eval/treatment? yes   Does pt have pain? denies   Bed Mobility  Rolling: NT  Supine to sit: Independent  Sit to supine: independent  Scooting: independent    Transfers Sit to stand: independent  Stand to sit: independent  Stand pivot: NT   Ambulation    200 feet with no AD independent     Stair negotiation: ascended and descended  NT   ROM BUE:  See OT eval   BLE:  WFL   Strength BUE:  See OT eval   BLE:  knee ext 5/5  Ankle DF 5/5   Balance Sitting EOB:  independent  Dynamic Standing:  independent     -Pt is A & O x 3  -Sensation:  Pt denies numbness and tingling to extremities  -Edema:  unremarkable     Therapeutic Exercises:  Functional activity     Patient education  Pt educated on safety, sequencing of transfers, and role of PT    Patient response to education:   Pt verbalized understanding Pt demonstrated skill Pt requires further education in this area   yes yes no     ASSESSMENT:  Conditions Requiring Skilled Therapeutic Intervention:  NA    Comments:  Pt received supine in bed and agreeable to PT session   Pt able to get out of bed, stand, and ambulate without difficulty or assist. Pt is at independent baseline with no acute care PT goals/needs identified. DC from PT service. Pt with all needs met and call light in reach. Pt would benefit from continued PT POC to address functional deficits described above. Treatment:  Patient practiced and was instructed in the following treatment:     Patient education provided continuously throughout session for sequencing, safety maintenance, and improving any deficits found during the evaluation. PHYSICAL THERAPY PLAN OF CARE:    PT POC is established based on physician order and patient diagnosis     Referring provider/PT Order:    11/10/21 1000  PT eval and treat        Carolyn Villalobos MD        Diagnosis:  Syncope and collapse [R55]  TIA (transient ischemic attack) [G45.9]    Pt is at independent baseline with no acute care PT goals/needs identified. DC from PT service. Time in  1420  Time out  1440    Total Treatment Time  10 minutes     Evaluation Time includes thorough review of current medical information, gathering information on past medical history/social history and prior level of function, completion of standardized testing/informal observation of tasks, assessment of data and education on plan of care and goals.     CPT codes:  [x] Low Complexity PT evaluation 05610  [] Moderate Complexity PT evaluation 84242  [] High Complexity PT evaluation 62332  [] PT Re-evaluation 10898  [] Gait training 34552 -- minutes  [] Manual therapy [id number] -- minutes  [x] Therapeutic activities 29654 10 minutes  [] Therapeutic exercises 53756 -- minutes  [] Neuromuscular reeducation 06917 -- minutes     Rhona Ramon, DEBBIE ordoñez, PT, DPT  [id number]

## 2021-11-10 NOTE — DISCHARGE SUMMARY
Hospitalist Discharge Summary    Patient ID: Evone Sandifer   Patient : 1950  Patient's PCP: Lazara Sweeney MD    Admit Date: 2021   Admitting Physician: Yanet Elliott MD    Discharge Date:  11/10/2021   Discharge Physician: Jia Albarran MD   Discharge Condition: Stable  Discharge Disposition: [facility] course in brief:  (Please refer to daily progress notes for a comprehensive review of the hospitalization by requesting medical records)     70y.o. year old male  with history of HTN, Hydrocephalus, comes in to the hospital after experiencing syncopal event while at [facility] earlier today. Pt does not remember the details of the incident. Family at bedside witness the event they state the patient passed out for about 4 minutes and was not arousable. No seizure loss of bowel or bladder or biting of tongue. There was left sided weakness after the incident that lasted for some time that has since been resolved. The pt currently denies any symptoms of weakness and states he is almost back to his baseline. In Ed troponin unremarkable. CTA neck showed Less than 50% stenosis of the bilateral cervical ICAs. CTA of the head showed No evidence of large vessel occlusion.  There is moderate narrowing of a distal left M2 branch. Pt has history of hydrocephalus and history of craniotomy at [facility] in the past. The patient is being admitted for further workup and management. Cardiology consulted for cardiac syncope ventricular tachycardia hypertension. Patient with stress test on 10/11/2021 which was unremarkable. Perfusion scans were also unremarkable. EEG was without significant findings. MRI brain showed no acute findings except for history of hydrocephalus. Neurosurgery was consulted for evaluation of hydrocephalus. Patient hydrocephalus has been managed by [facility].   No interventions recommended at this time and was recommended to follow-up with [facility] specialist. PT OT evals were reassuring for home discharge. Discharge exam  BP (!) 150/80   Pulse 64   Temp 97.7 °F (36.5 °C) (Temporal)   Resp 18   Ht 6' (1.829 m)   Wt 259 lb (117.5 kg)   SpO2 96%   BMI 35.13 kg/m²   General appearance: No apparent distress, appears stated age and cooperative. HEENT:  Conjunctivae/corneas clear. Neck: Supple. No jugular venous distention. Respiratory: Clear to auscultation bilaterally, normal respiratory effort  Cardiovascular: Regular rate rhythm, normal S1-S2  Abdomen: Soft, nontender, nondistended  Musculoskeletal: No clubbing, cyanosis, no bilateral lower extremity edema. Brisk capillary refill. Skin:  No rashes  on visible skin  Neurologic: awake, alert and following commands     Consults:   IP CONSULT TO HOSPITALIST  IP CONSULT TO NEUROLOGY  IP CONSULT TO CARDIOLOGY  IP CONSULT TO NEUROSURGERY    Discharge Diagnoses:    Syncopal episode  History of NPH  Hypertension  Hypothyroidism      Discharge Instructions / Follow up:    No future appointments. Continued appropriate risk factor modification of blood pressure, diabetes and serum lipids will remain essential to reducing risk of future atherosclerotic development    Activity: activity as tolerated    Significant labs:  CBC:   Recent Labs     11/08/21  0506   WBC 6.4   RBC 4.75   HGB 13.8   HCT 39.9   MCV 84.0   RDW 13.2        BMP:   Recent Labs     11/08/21  0506 11/08/21  0506 11/09/21  0454 11/09/21  1609 11/10/21  0613      < > 139 139 137   K 2.8*  --  3.3* 4.1 3.5   CL 92*   < > 98 100 99   CO2 28   < > 30* 28 29   BUN 10   < > 10 9 9   CREATININE 0.9   < > 0.9 1.0 0.8   MG 2.0  --   --   --  1.8    < > = values in this interval not displayed. LFT:  Recent Labs     11/08/21  0506   PROT 6.4   ALKPHOS 48   ALT 28   AST 20   BILITOT 0.5     PT/INR: No results for input(s): INR, APTT in the last 72 hours. BNP: No results for input(s): BNP in the last 72 hours.   Hgb A1C:   Lab Results   Component Value Date    LABA1C 5.2 11/08/2021     Folate and B12:   Lab Results   Component Value Date    QQNDLCTY79 1551 (H) 11/08/2021   ,   Lab Results   Component Value Date    FOLATE >20.0 11/08/2021     Thyroid Studies:   Lab Results   Component Value Date    TSH 2.200 11/08/2021       Urinalysis:  No results found for: NITRU, WBCUA, BACTERIA, RBCUA, BLOODU, SPECGRAV, GLUCOSEU    Imaging:  CT HEAD WO CONTRAST    Result Date: 11/7/2021  EXAMINATION: CT OF THE HEAD WITHOUT CONTRAST  11/7/2021 3:34 pm TECHNIQUE: CT of the head was performed without the administration of intravenous contrast. Dose modulation, iterative reconstruction, and/or weight based adjustment of the mA/kV was utilized to reduce the radiation dose to as low as reasonably achievable. COMPARISON: None. HISTORY: ORDERING SYSTEM PROVIDED HISTORY: HEAD TRAUMA, CLOSED, MILD, GCS >= 13, NO RISK FACTORS, NEURO EXAM NORMAL TECHNOLOGIST PROVIDED HISTORY: Has a \"code stroke\" or \"stroke alert\" been called? ->No Reason for exam:->tia Decision Support Exception - unselect if not a suspected or confirmed emergency medical condition->Emergency Medical Condition (MA) What reading provider will be dictating this exam?->CRC FINDINGS: BRAIN/VENTRICLES: There is no acute intracranial hemorrhage, mass effect or midline shift. No abnormal extra-axial fluid collection. There is subtle periventricular low-density that suggest microangiopathy. The gray-white differentiation is maintained without evidence of an acute infarct. There is dilation of the ventricles including the 3rd and 4th ventricles concerning for developing hydrocephalus. If clinically indicated, MRI is of increased sensitivity. ORBITS: The visualized portion of the orbits demonstrate no acute abnormality. SINUSES: The visualized paranasal sinuses demonstrate partial opacification of multiple ethmoid air cells and in the maxillary antra bilaterally suggest chronic sinusitis. The mastoid air cells demonstrate no acute abnormality. SOFT TISSUES/SKULL:  No acute abnormality of the visualized skull or soft tissues. No acute intracranial abnormality. Dilated intracerebral ventricles concerning for developing hydrocephalus. Chronic appearing maxillary, ethmoid and right sphenoid sinusitis     XR CHEST PORTABLE    Result Date: 11/7/2021  EXAMINATION: ONE XRAY VIEW OF THE CHEST 11/7/2021 1:03 pm COMPARISON: None. HISTORY: ORDERING SYSTEM PROVIDED HISTORY: syncope TECHNOLOGIST PROVIDED HISTORY: Reason for exam:->syncope What reading provider will be dictating this exam?->CRC FINDINGS: A single frontal view the chest reveals the heart size to be at the upper limits of normal and left ventricular in contour. No focal consolidation pneumothorax or pleural effusions are identified. There are no signs of pneumothorax. Mild cardiomegaly without signs of congestion     CTA NECK W CONTRAST    Result Date: 11/7/2021  EXAMINATION: CTA OF THE HEAD WITH CONTRAST; CTA OF THE NECK 11/7/2021 12:34 pm: TECHNIQUE: CTA of the head/brain was performed with the administration of intravenous contrast. Multiplanar reformatted images are provided for review. MIP images are provided for review. Dose modulation, iterative reconstruction, and/or weight based adjustment of the mA/kV was utilized to reduce the radiation dose to as low as reasonably achievable.; CTA of the neck was performed with the administration of intravenous contrast. Multiplanar reformatted images are provided for review. MIP images are provided for review. Stenosis of the internal carotid arteries measured using NASCET criteria. Dose modulation, iterative reconstruction, and/or weight based adjustment of the mA/kV was utilized to reduce the radiation dose to as low as reasonably achievable. COMPARISON: None. HISTORY: ORDERING SYSTEM PROVIDED HISTORY: tia TECHNOLOGIST PROVIDED HISTORY: Has a \"code stroke\" or \"stroke alert\" been called? ->No Reason for exam:->tia Decision Support Exception - unselect if not a suspected or confirmed emergency medical condition->Emergency Medical Condition (MA) What reading provider will be dictating this exam?->CRC FINDINGS: CTA NECK: Aortic arch: Three-vessel aortic arch. Common carotids: Retropharyngeal course of the bilateral CCA is. No high-grade stenosis of the bilateral CCA is. Internal carotids: Less than 50% stenosis of the bilateral ICA origin secondary to soft greater than calcified plaque. Vertebrals: The cervical vertebral arteries demonstrate no high-grade stenosis, occlusion, or dissection. Calcified plaque the left vertebral artery origin. Codominant vertebral arteries. Other findings: Degenerative changes of the spine. CTA HEAD: ANTERIOR CIRCULATION: Intracranial ICA: No high-grade stenosis or occlusion of the intracranial internal carotid arteries. Calcified plaque in the bilateral carotid siphons. MCA: No proximal large vessel occlusion. Moderate narrowing of a left distal M2 branch near the apex of the sylvian fissure (series 7, image 309 and series 8, image 64 and series 9, image 72). YESIKA: No flow-limiting stenosis. POSTERIOR CIRCULATION: Intradural vertebral arteries: No flow-limiting stenosis. Basilar artery: No flow-limiting stenosis. PCA: No flow-limiting stenosis. Dural sinuses: The major dural venus sinuses are grossly patent. Other: Prior right frontal craniotomy. Small paranasal sinus mucous retention cysts and mild mucosal thickening. CTA Neck: Less than 50% stenosis of the bilateral cervical ICAs. No high-grade stenosis of the cervical vertebral arteries. CTA Head: No evidence of large vessel occlusion. There is moderate narrowing of a distal left M2 branch as described above. XR EYE FOREIGN BODY    Result Date: 11/8/2021  EXAMINATION: TWO XRAY VIEWS OF THE ORBITS 11/8/2021 COMPARISON: None.  HISTORY: ORDERING SYSTEM PROVIDED HISTORY: MRI TECHNOLOGIST PROVIDED HISTORY: Reason for exam:->MRI What reading provider will be dictating this exam?->CRC FINDINGS: No evidence of radiopaque foreign body within the orbits. No other acute abnormalities noted. No evidence of metallic foreign body within the orbits. CTA HEAD W CONTRAST    Result Date: 11/7/2021  EXAMINATION: CTA OF THE HEAD WITH CONTRAST; CTA OF THE NECK 11/7/2021 12:34 pm: TECHNIQUE: CTA of the head/brain was performed with the administration of intravenous contrast. Multiplanar reformatted images are provided for review. MIP images are provided for review. Dose modulation, iterative reconstruction, and/or weight based adjustment of the mA/kV was utilized to reduce the radiation dose to as low as reasonably achievable.; CTA of the neck was performed with the administration of intravenous contrast. Multiplanar reformatted images are provided for review. MIP images are provided for review. Stenosis of the internal carotid arteries measured using NASCET criteria. Dose modulation, iterative reconstruction, and/or weight based adjustment of the mA/kV was utilized to reduce the radiation dose to as low as reasonably achievable. COMPARISON: None. HISTORY: ORDERING SYSTEM PROVIDED HISTORY: tia TECHNOLOGIST PROVIDED HISTORY: Has a \"code stroke\" or \"stroke alert\" been called? ->No Reason for exam:->tia Decision Support Exception - unselect if not a suspected or confirmed emergency medical condition->Emergency Medical Condition (MA) What reading provider will be dictating this exam?->CRC FINDINGS: CTA NECK: Aortic arch: Three-vessel aortic arch. Common carotids: Retropharyngeal course of the bilateral CCA is. No high-grade stenosis of the bilateral CCA is. Internal carotids: Less than 50% stenosis of the bilateral ICA origin secondary to soft greater than calcified plaque. Vertebrals: The cervical vertebral arteries demonstrate no high-grade stenosis, occlusion, or dissection. Calcified plaque the left vertebral artery origin. Codominant vertebral arteries.  Other findings: Degenerative changes of the spine. CTA HEAD: ANTERIOR CIRCULATION: Intracranial ICA: No high-grade stenosis or occlusion of the intracranial internal carotid arteries. Calcified plaque in the bilateral carotid siphons. MCA: No proximal large vessel occlusion. Moderate narrowing of a left distal M2 branch near the apex of the sylvian fissure (series 7, image 309 and series 8, image 64 and series 9, image 72). YESIKA: No flow-limiting stenosis. POSTERIOR CIRCULATION: Intradural vertebral arteries: No flow-limiting stenosis. Basilar artery: No flow-limiting stenosis. PCA: No flow-limiting stenosis. Dural sinuses: The major dural venus sinuses are grossly patent. Other: Prior right frontal craniotomy. Small paranasal sinus mucous retention cysts and mild mucosal thickening. CTA Neck: Less than 50% stenosis of the bilateral cervical ICAs. No high-grade stenosis of the cervical vertebral arteries. CTA Head: No evidence of large vessel occlusion. There is moderate narrowing of a distal left M2 branch as described above. MRI BRAIN WO CONTRAST    Result Date: 11/8/2021  EXAMINATION: MRI OF THE BRAIN WITHOUT CONTRAST  11/8/2021 2:01 pm TECHNIQUE: Multiplanar multisequence MRI of the brain was performed without the administration of intravenous contrast. COMPARISON: Head CT 11/07/2021 HISTORY: ORDERING SYSTEM PROVIDED HISTORY: right sided weakness, TIA vs stroke FINDINGS: INTRACRANIAL STRUCTURES/VENTRICLES: No evidence of restricted diffusion to suggest acute infarct. No evidence of acute intracranial hemorrhage. Single foci of low intensity signal on the gradient echo acquisition within the medial left cerebellar hemisphere and within the medial dorsal aspect of the right thalamus compatible with sequelae of prior microhemorrhage. There is no evidence of an intracranial mass or extraaxial fluid collection. No significant mass effect or midline shift. Scattered patchy fociof T2/FLAIR hyperintensity are present within supratentorial white matter which is a nonspecific finding but likely represents mild chronic microvascular ischemia. Small amount of encephalomalacia present within the right occipital pole compatible with prior right PCA infarct. Linear gliosis identified throughout the right frontal lobe from prior ventriculostomy catheter. There is mild generalized volume loss. Disproportionate enlargement of the ventricles relative to the degree of parenchymal volume loss suspicious for communicating hydrocephalus. The sellar/suprasellar regions appear unremarkable. The normal signal voids within the major intracranial vessels appear maintained. ORBITS: The visualized portion of the orbits demonstrate no acute abnormality. Bilateral pseudophakia. SINUSES: Scattered mild mucosal thickening throughout the paranasal sinuses with small mucous retention cyst within the left maxillary sinus. Mastoid air cells are clear. BONES/SOFT TISSUES: The bone marrow signal intensity appears normal. The soft tissues demonstrate no acute abnormality. Status post right frontal adán hole craniotomy. 1. Moderate to severe supratentorial hydrocephalus, similar to prior head CT from 11/07/2021. 2. No evidence of restricted diffusion to suggest acute infarct. 3. 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        Discharge Medications:      Medication List      START taking these medications    fluticasone 50 MCG/ACT nasal spray  Commonly known as: FLONASE  1 spray by Each Nostril route daily        CONTINUE taking these medications    bumetanide 2 MG tablet  Commonly known as: BUMEX     carvedilol 25 MG tablet  Commonly known as: COREG     FLUoxetine 20 MG capsule  Commonly known as: PROZAC     levothyroxine 25 MCG tablet  Commonly known as: SYNTHROID     metOLazone 2.5 MG tablet  Commonly known as: ZAROXOLYN     penicillin v potassium 500 MG tablet  Commonly known as: VEETID     potassium chloride 20 MEQ extended release tablet  Commonly known as: KLOR-CON M     pregabalin 150 MG capsule  Commonly known as: LYRICA     quinapril 20 MG tablet  Commonly known as: ACCUPRIL     * therapeutic multivitamin-minerals tablet     * PreserVision AREDS 2 Caps         * This list has 2 medication(s) that are the same as other medications prescribed for you. Read the directions carefully, and ask your doctor or other care provider to review them with you. Where to Get Your Medications      You can get these medications from any pharmacy    Bring a paper prescription for each of these medications  · fluticasone 50 MCG/ACT nasal spray         Time Spent on discharge is more than 45 minutes in the examination, evaluation, counseling and review of medications and discharge plan.    +++++++++++++++++++++++++++++++++++++++++++++++++  MD OLIVE Godfrey/ Bahman Roberts [address] [place] [address]  +++++++++++++++++++++++++++++++++++++++++++++++++  NOTE: This report was transcribed using voice recognition software. Every effort was made to ensure accuracy; however, inadvertent computerized transcription errors may be present.

## 2021-11-10 NOTE — PROGRESS NOTES
INPATIENT CARDIOLOGY FOLLOW-UP    Name: Lamin Campos    Age: 70 y.o. Date of Admission: 11/7/2021 12:31 PM    Date of Service: 11/10/2021    Chief Complaint: Follow-up for presumed vasodepressor mediated syncope, ventricular tachycardia, hypertension, hydrocephalus, resolved hypokalemia    Interim History: The patient has remained compensated from a cardiovascular standpoint with no additional symptomatology similar to that of his presentation and stabilization of his volume status with no clinical evidence of volume overload and incomplete maintenance of intake and output limiting assessment of his response to present management. The previous afternoon, he suffered an episode of ventricular tachycardia of approximately 10 seconds duration in the absence of metabolic derangements. No reported symptoms were noted. Persistent evidence of hypertension is presently noted. Review of Systems: The remainder of a complete multisystem review including consitutional, central nervous, respiratory, circulatory, gastrointestinal, genitourinary, endocrinologic, hematologic, musculoskeletal and psychiatric are negative.     Problem List:  Patient Active Problem List   Diagnosis    TIA (transient ischemic attack)    Transient alteration of awareness    Obstructive hydrocephalus (HCC)       Allergies:  No Known Allergies    Current Medications:  Current Facility-Administered Medications   Medication Dose Route Frequency Provider Last Rate Last Admin    regadenoson (LEXISCAN) injection 0.4 mg  0.4 mg IntraVENous ONCE PRN Indra Mckeon MD        potassium chloride (KLOR-CON M) extended release tablet 40 mEq  40 mEq Oral PRN Mohit Dong MD   40 mEq at 11/09/21 0935    Or    potassium bicarb-citric acid (EFFER-K) effervescent tablet 40 mEq  40 mEq Oral PRN Mohit Dong MD        Or   [facility] potassium chloride 10 mEq/100 mL IVPB (Peripheral Line)  10 mEq IntraVENous PRN Mohit Dong  mL/hr at SpO2 90%   BMI 35.13 kg/m²   Weight change: Wt Readings from Last 3 Encounters:   11/07/21 259 lb (117.5 kg)     The patient is awake, alert and in no discomfort or distress. No gross musculoskeletal deformity is present. No significant skin or nail changes are present. Gross examination of head, eyes, nose and throat are negative. Jugular venous pressure is normal and no carotid bruits are present. Normal respiratory effort is noted with no accessory muscle usage present. Lung fields are clear to ascultation. Cardiac examination is notable for a regular rate and rhythm with no palpable thrill. No gallop rhythm or cardiac murmur are identified. A benign abdominal examination is present with the exception of obesity and no masses or organomegaly. Intact pulses are present throughout all extremities and no peripheral edema is present. No focal neurologic deficits are present. Intake/Output:    Intake/Output Summary (Last 24 hours) at 11/10/2021 0553  Last data filed at 11/9/2021 2137  Gross per 24 hour   Intake 870 ml   Output    Net 870 ml     No intake/output data recorded. Laboratory Tests:  Lab Results   Component Value Date    CREATININE 1.0 11/09/2021    BUN 9 11/09/2021     11/09/2021    K 4.1 11/09/2021     11/09/2021    CO2 28 11/09/2021     No results for input(s): CKTOTAL, CKMB in the last 72 hours.     Invalid input(s): TROPONONI  No results found for: BNP  Lab Results   Component Value Date    WBC 6.4 11/08/2021    RBC 4.75 11/08/2021    HGB 13.8 11/08/2021    HCT 39.9 11/08/2021    MCV 84.0 11/08/2021    MCH 29.1 11/08/2021    MCHC 34.6 11/08/2021    RDW 13.2 11/08/2021     11/08/2021    MPV 10.9 11/08/2021     Recent Labs     11/08/21  0506   ALKPHOS 48   ALT 28   AST 20   PROT 6.4   BILITOT 0.5   LABALBU 3.7     Lab Results   Component Value Date    MG 2.0 11/08/2021     No results found for: PROTIME, INR  Lab Results   Component Value Date    TSH 2.200 11/08/2021     No components found for: CHLPL  No results found for: TRIG  No results found for: HDL  No results found for: [address]    Cardiac Tests:  Telemetry findings reviewed: Sinus rhythm with ventricular ectopy and no additional episodes of ventricular tachyarrhythmias beyond that of the previous afternoon, no additional tachy/bradyarrhythmias overnight      ASSESSMENT / PLAN: On a clinical basis, the patient presently is compensated from a cardiovascular standpoint with an episode of asymptomatic ventricular tachycardia the previous afternoon in the absence of metabolic derangements. Presently his existing medical regimen inclusive of beta-blocker will be maintained with plans of a functional ischemic assessment to further assess the potential origin of his arrhythmia. Continued careful monitoring of his electrolyte status will be necessary in the face of diuretics and prior episodes of hypokalemia with his volume status presently stable and no additional needs for metolazone. As previously outlined, continued appropriate lifestyle modification to achieve weight reduction will be essential to diastolic heart failure management and as recommended following discharge a formal assessment of the presence or absence of obstructive sleep apnea would be advisable with initiation of nocturnal CPAP as appropriate to assist in diastolic heart failure management as well as reduce the risk of the development of atrial arrhythmias. Ongoing aggressive risk factor modification of blood pressure and serum lipids remain essential to reducing risk of future atherosclerotic development. Additional management will be deferred to the primary care service. Note: This report was completed utilizing computer voice recognition software. Every effort has been made to ensure accuracy, however; inadvertent computerized transcription errors may be present. Bar Contreras.  Ambika Potts, [address] [place] [address]

## 2021-11-11 ENCOUNTER — TELEPHONE (OUTPATIENT)
Dept: CARDIOLOGY CLINIC | Age: 71
End: 2021-11-11

## 2021-11-11 NOTE — TELEPHONE ENCOUNTER
----- Message from Indra Mckeon MD sent at 11/10/2021  3:14 PM EST -----  This is the correct [facility] patient of [place] admitted with likely vasopressor mediated syncope and during hospitalization had an episode of ventricular tachycardia with an event monitor ordered during hospitalization prior to discharge.   Please schedule follow-up with him in approximately 1 month

## 2021-11-11 NOTE — TELEPHONE ENCOUNTER
According to wife patient scheduled for an event monitor tomorrow and they will schedule a f/u at [address] [place] [address] in a month.

## 2021-11-12 DIAGNOSIS — I48.91 ATRIAL FIBRILLATION, UNSPECIFIED TYPE (HCC): Primary | ICD-10-CM

## 2021-11-12 PROBLEM — I89.0 LYMPHEDEMA: Status: ACTIVE | Noted: 2021-11-12

## 2021-11-12 PROBLEM — G91.9 HYDROCEPHALUS (HCC): Status: ACTIVE | Noted: 2019-01-29

## 2021-11-12 PROBLEM — R26.9 GAIT DISORDER: Status: ACTIVE | Noted: 2019-01-29

## 2021-11-12 PROBLEM — I70.203 UNSPECIFIED ATHEROSCLEROSIS OF NATIVE ARTERIES OF EXTREMITIES, BILATERAL LEGS (HCC): Status: ACTIVE | Noted: 2021-11-12

## 2021-12-30 ENCOUNTER — OFFICE VISIT (OUTPATIENT)
Dept: CARDIOLOGY CLINIC | Age: 71
End: 2021-12-30
Payer: MEDICARE

## 2021-12-30 VITALS
HEART RATE: 60 BPM | SYSTOLIC BLOOD PRESSURE: 148 MMHG | TEMPERATURE: 68 F | DIASTOLIC BLOOD PRESSURE: 78 MMHG | WEIGHT: 263 LBS | BODY MASS INDEX: 35.62 KG/M2 | HEIGHT: 72 IN | RESPIRATION RATE: 20 BRPM

## 2021-12-30 DIAGNOSIS — I48.91 ATRIAL FIBRILLATION, UNSPECIFIED TYPE (HCC): ICD-10-CM

## 2021-12-30 DIAGNOSIS — I10 HYPERTENSION, UNSPECIFIED TYPE: Primary | ICD-10-CM

## 2021-12-30 PROCEDURE — 4040F PNEUMOC VAC/ADMIN/RCVD: CPT | Performed by: INTERNAL MEDICINE

## 2021-12-30 PROCEDURE — G8484 FLU IMMUNIZE NO ADMIN: HCPCS | Performed by: INTERNAL MEDICINE

## 2021-12-30 PROCEDURE — 4004F PT TOBACCO SCREEN RCVD TLK: CPT | Performed by: INTERNAL MEDICINE

## 2021-12-30 PROCEDURE — 93000 ELECTROCARDIOGRAM COMPLETE: CPT | Performed by: INTERNAL MEDICINE

## 2021-12-30 PROCEDURE — 3017F COLORECTAL CA SCREEN DOC REV: CPT | Performed by: INTERNAL MEDICINE

## 2021-12-30 PROCEDURE — G8427 DOCREV CUR MEDS BY ELIG CLIN: HCPCS | Performed by: INTERNAL MEDICINE

## 2021-12-30 PROCEDURE — 1123F ACP DISCUSS/DSCN MKR DOCD: CPT | Performed by: INTERNAL MEDICINE

## 2021-12-30 PROCEDURE — 99214 OFFICE O/P EST MOD 30 MIN: CPT | Performed by: INTERNAL MEDICINE

## 2021-12-30 PROCEDURE — G8417 CALC BMI ABV UP PARAM F/U: HCPCS | Performed by: INTERNAL MEDICINE

## 2021-12-30 RX ORDER — SPIRONOLACTONE 25 MG/1
25 TABLET ORAL DAILY
COMMUNITY
Start: 2021-12-09

## 2021-12-30 RX ORDER — BUSPIRONE HYDROCHLORIDE 10 MG/1
10 TABLET ORAL 3 TIMES DAILY
COMMUNITY
End: 2021-12-30

## 2021-12-30 RX ORDER — LEVOTHYROXINE AND LIOTHYRONINE 38; 9 UG/1; UG/1
1 TABLET ORAL DAILY
COMMUNITY
End: 2021-12-30

## 2021-12-30 RX ORDER — HYDROCHLOROTHIAZIDE 25 MG/1
1 TABLET ORAL DAILY
COMMUNITY
End: 2021-12-30

## 2021-12-30 NOTE — PROGRESS NOTES
CHIEF COMPLAINT: Syncope/NSVT/HTN/Hydrocephalus,     HISTORY OF PRESENT ILLNESS: Patient is a 70 y.o. male seen at the request of Rosendo Moore MD.      Seen in follow up. No CP or SOB. History reviewed. No pertinent past medical history. Patient Active Problem List   Diagnosis    TIA (transient ischemic attack)    Transient alteration of awareness    Hydrocephalus (HCC)    Gait disorder    Hypertension    Peroneal neuropathy    Lymphedema    Unspecified atherosclerosis of native arteries of extremities, bilateral legs (HCC)       Allergies   Allergen Reactions    Latex Other (See Comments) and Rash    Adhesive Tape     Chlorhexidine Gluconate Other (See Comments)     BURNED SKIN       Current Outpatient Medications   Medication Sig Dispense Refill    spironolactone (ALDACTONE) 25 MG tablet Take 25 mg by mouth daily      fluticasone (FLONASE) 50 MCG/ACT nasal spray 1 spray by Each Nostril route daily 16 g 0    potassium chloride (KLOR-CON M) 20 MEQ extended release tablet Take 2 tablets by mouth daily 60 tablet 3    carvedilol (COREG) 25 MG tablet Take 25 mg by mouth 2 times daily (with meals)      FLUoxetine (PROZAC) 20 MG capsule Take 20 mg by mouth daily      levothyroxine (SYNTHROID) 25 MCG tablet Take 25 mcg by mouth Daily      pregabalin (LYRICA) 150 MG capsule Take 150 mg by mouth 2 times daily.  quinapril (ACCUPRIL) 20 MG tablet Take 40 mg by mouth nightly      Multiple Vitamins-Minerals (THERAPEUTIC MULTIVITAMIN-MINERALS) tablet Take 1 tablet by mouth daily      Multiple Vitamins-Minerals (PRESERVISION AREDS 2) CAPS Take 1 capsule by mouth 2 times daily       No current facility-administered medications for this visit.        Social History     Socioeconomic History    Marital status: Unknown     Spouse name: Not on file    Number of children: Not on file    Years of education: Not on file    Highest education level: Not on file   Occupational History    Not on file Tobacco Use    Smoking status: Never Smoker    Smokeless tobacco: Current User     Types: Snuff   Vaping Use    Vaping Use: Never used   Substance and Sexual Activity    Alcohol use: Yes     Alcohol/week: 3.0 standard drinks     Types: 3 Cans of beer per week     Comment: daily    Drug use: Never    Sexual activity: Not on file   Other Topics Concern    Not on file   Social History Narrative    Not on file     Social Determinants of Health     Financial Resource Strain:     Difficulty of Paying Living Expenses: Not on file   Food Insecurity:     Worried About Running Out of Food in the Last Year: Not on file    Stacia of Food in the Last Year: Not on file   Transportation Needs:     Lack of Transportation (Medical): Not on file    Lack of Transportation (Non-Medical): Not on file   Physical Activity:     Days of Exercise per Week: Not on file    Minutes of Exercise per Session: Not on file   Stress:     Feeling of Stress : Not on file   Social Connections:     Frequency of Communication with Friends and Family: Not on file    Frequency of Social Gatherings with Friends and Family: Not on file    Attends Religion Services: Not on file    Active Member of [address] [place] [address] [employer] or Organizations: Not on file    Attends Club or Organization Meetings: Not on file    Marital Status: Not on file   Intimate Partner Violence:     Fear of Current or Ex-Partner: Not on file    Emotionally Abused: Not on file    Physically Abused: Not on file    Sexually Abused: Not on file   Housing Stability:     Unable to Pay for Housing in the Last Year: Not on file    Number of [place] in the Last Year: Not on file    Unstable Housing in the Last Year: Not on file       No family history on file. Review of Systems:  Heart: as above   Lungs: as above   Eyes: denies changes in vision or discharge. Ears: denies changes in hearing or pain.    Nose: denies epistaxis or masses   Throat: denies sore throat or trouble swallowing. Neuro: denies numbness, tingling, tremors. Skin: denies rashes or itching. : denies hematuria, dysuria   GI: denies vomiting, diarrhea   Psych: denies mood changed, anxiety, depression. All other systems negative. Physical Exam   BP (!) 148/78   Pulse 60   Temp (!) 68 °F (20 °C)   Resp 20   Ht 6' (1.829 m)   Wt 263 lb (119.3 kg)   BMI 35.67 kg/m²   Constitutional: Oriented to person, place, and time. Well-developed and well-nourished. No distress. Head: Normocephalic and atraumatic. Eyes: EOM are normal. Pupils are equal, round, and reactive to light. Neck: Normal range of motion. Neck supple. No hepatojugular reflux and no JVD present. Carotid bruit is not present. No tracheal deviation present. No thyromegaly present. Cardiovascular: Normal rate, regular rhythm, normal heart sounds and intact distal pulses. Exam reveals no gallop and no friction rub. No murmur heard. Pulmonary/Chest: Effort normal and breath sounds normal. No respiratory distress. No wheezes. No rales. No tenderness. Abdominal: Soft. Bowel sounds are normal. No distension and no mass. No tenderness. No rebound and no guarding. Musculoskeletal: Normal range of motion. No edema and no tenderness. Lymphadenopathy:   No cervical adenopathy. No groin adenopathy. Neurological: Alert and oriented to person, place, and time. Skin: Skin is warm and dry. No rash noted. Not diaphoretic. No erythema. Psychiatric: Normal mood and affect. Behavior is normal.     EKG personally reviewed 12/30/21:  normal EKG, normal sinus rhythm. ASSESSMENT AND PLAN:  Patient Active Problem List   Diagnosis    TIA (transient ischemic attack)    Transient alteration of awareness    Hydrocephalus (HCC)    Gait disorder    Hypertension    Peroneal neuropathy    Lymphedema    Unspecified atherosclerosis of native arteries of extremities, bilateral legs ([address]     1. Syncope:    Observe.     2. NSVT:    3. Diastolic CHF:    Stable echo 5/2021. Stable stress 11/7/2021. ACEI/BB/aldactone. 4. Hx of TIA    5. Hydrocephalus    6. HTN: Observe. Manda Torres D.O.   Cardiologist  Cardiology, [facility]

## 2022-10-26 ENCOUNTER — HOSPITAL ENCOUNTER (OUTPATIENT)
Dept: HOSPITAL 83 - US | Age: 72
Discharge: HOME | End: 2022-10-26
Attending: PODIATRIST
Payer: COMMERCIAL

## 2022-10-26 DIAGNOSIS — R60.9: Primary | ICD-10-CM

## 2022-10-26 DIAGNOSIS — M79.604: ICD-10-CM

## 2022-11-07 ENCOUNTER — HOSPITAL ENCOUNTER (EMERGENCY)
Dept: HOSPITAL 83 - ED | Age: 72
Discharge: HOME | End: 2022-11-07
Payer: COMMERCIAL

## 2022-11-07 VITALS — HEIGHT: 71.97 IN | BODY MASS INDEX: 36.57 KG/M2 | WEIGHT: 270 LBS

## 2022-11-07 DIAGNOSIS — Z98.890: ICD-10-CM

## 2022-11-07 DIAGNOSIS — I95.9: Primary | ICD-10-CM

## 2022-11-07 DIAGNOSIS — Z87.891: ICD-10-CM

## 2022-11-07 DIAGNOSIS — Z79.899: ICD-10-CM

## 2022-11-07 LAB
ALP SERPL-CCNC: 48 U/L (ref 45–117)
ALT SERPL W P-5'-P-CCNC: 40 U/L (ref 12–78)
AST SERPL-CCNC: 17 IU/L (ref 3–35)
BASOPHILS # BLD AUTO: 0.1 10*3/UL (ref 0–0.1)
BASOPHILS NFR BLD AUTO: 1.2 % (ref 0–1)
BUN SERPL-MCNC: 14 MG/DL (ref 7–24)
CHLORIDE SERPL-SCNC: 98 MMOL/L (ref 98–107)
CREAT SERPL-MCNC: 1.21 MG/DL (ref 0.7–1.3)
EOSINOPHIL # BLD AUTO: 0.4 10*3/UL (ref 0–0.4)
EOSINOPHIL # BLD AUTO: 6.3 % (ref 1–4)
ERYTHROCYTE [DISTWIDTH] IN BLOOD BY AUTOMATED COUNT: 12.5 % (ref 0–14.5)
HCT VFR BLD AUTO: 40 % (ref 42–52)
LYMPHOCYTES # BLD AUTO: 0.9 10*3/UL (ref 1.3–4.4)
LYMPHOCYTES NFR BLD AUTO: 15.2 % (ref 27–41)
MCH RBC QN AUTO: 30.5 PG (ref 27–31)
MCHC RBC AUTO-ENTMCNC: 33.5 G/DL (ref 33–37)
MCV RBC AUTO: 90.9 FL (ref 80–94)
MONOCYTES # BLD AUTO: 0.6 10*3/UL (ref 0.1–1)
MONOCYTES NFR BLD MANUAL: 9.2 % (ref 3–9)
NEUT #: 4.1 10*3/UL (ref 2.3–7.9)
NEUT %: 67.6 % (ref 47–73)
NRBC BLD QL AUTO: 0 10*3/UL (ref 0–0)
PLATELET # BLD AUTO: 223 10*3/UL (ref 130–400)
PMV BLD AUTO: 9.7 FL (ref 9.6–12.3)
POTASSIUM SERPL-SCNC: 4 MMOL/L (ref 3.5–5.1)
PROT SERPL-MCNC: 6.4 GM/DL (ref 6.4–8.2)
RBC # BLD AUTO: 4.4 10*6/UL (ref 4.5–5.9)
SODIUM SERPL-SCNC: 132 MMOL/L (ref 136–145)
WBC NRBC COR # BLD AUTO: 6 10*3/UL (ref 4.8–10.8)

## 2022-11-15 NOTE — TELEPHONE ENCOUNTER
I spoke with patient's wife and she said someone just called from [address] [place] [address] and scheduled for event monitor tomorrow 11/12 at 1:00pm .

## 2022-12-27 ENCOUNTER — HOSPITAL ENCOUNTER (EMERGENCY)
Dept: HOSPITAL 83 - ED | Age: 72
Discharge: LEFT BEFORE DISCHARGE | End: 2022-12-27
Payer: COMMERCIAL

## 2022-12-27 DIAGNOSIS — Z53.21: ICD-10-CM

## 2022-12-27 DIAGNOSIS — Z76.89: Primary | ICD-10-CM
